# Patient Record
Sex: FEMALE | Race: WHITE | NOT HISPANIC OR LATINO | Employment: OTHER | URBAN - METROPOLITAN AREA
[De-identification: names, ages, dates, MRNs, and addresses within clinical notes are randomized per-mention and may not be internally consistent; named-entity substitution may affect disease eponyms.]

---

## 2017-08-11 ENCOUNTER — OFFICE VISIT (OUTPATIENT)
Dept: OPHTHALMOLOGY | Facility: CLINIC | Age: 67
End: 2017-08-11
Payer: MEDICARE

## 2017-08-11 DIAGNOSIS — H52.4 PRESBYOPIA: ICD-10-CM

## 2017-08-11 DIAGNOSIS — Z98.890 S/P LASIK SURGERY: ICD-10-CM

## 2017-08-11 DIAGNOSIS — H40.1131 PRIMARY OPEN ANGLE GLAUCOMA OF BOTH EYES, MILD STAGE: Primary | ICD-10-CM

## 2017-08-11 DIAGNOSIS — H25.13 AGE-RELATED NUCLEAR CATARACT OF BOTH EYES: ICD-10-CM

## 2017-08-11 PROCEDURE — 92014 COMPRE OPH EXAM EST PT 1/>: CPT | Performed by: STUDENT IN AN ORGANIZED HEALTH CARE EDUCATION/TRAINING PROGRAM

## 2017-08-11 PROCEDURE — 92015 DETERMINE REFRACTIVE STATE: CPT | Mod: GY | Performed by: STUDENT IN AN ORGANIZED HEALTH CARE EDUCATION/TRAINING PROGRAM

## 2017-08-11 RX ORDER — LATANOPROST 50 UG/ML
1 SOLUTION/ DROPS OPHTHALMIC AT BEDTIME
Qty: 3 BOTTLE | Refills: 3 | Status: SHIPPED | OUTPATIENT
Start: 2017-08-11 | End: 2018-07-31

## 2017-08-11 ASSESSMENT — TONOMETRY
OS_IOP_MMHG: 16
IOP_METHOD: APPLANATION
OD_IOP_MMHG: 17

## 2017-08-11 ASSESSMENT — EXTERNAL EXAM - RIGHT EYE: OD_EXAM: NORMAL

## 2017-08-11 ASSESSMENT — REFRACTION_MANIFEST
OD_AXIS: 080
OD_CYLINDER: +0.50
OD_SPHERE: -0.25
OS_CYLINDER: +1.75
OD_ADD: +3.00
OS_ADD: +3.00
OS_AXIS: 100
OS_SPHERE: -0.50

## 2017-08-11 ASSESSMENT — CONF VISUAL FIELD
OD_NORMAL: 1
OS_NORMAL: 1

## 2017-08-11 ASSESSMENT — VISUAL ACUITY
METHOD: SNELLEN - LINEAR
CORRECTION_TYPE: GLASSES
OS_CC: 20/50
OD_CC: 20/20

## 2017-08-11 ASSESSMENT — CUP TO DISC RATIO
OD_RATIO: 0.45
OS_RATIO: 0.65

## 2017-08-11 ASSESSMENT — EXTERNAL EXAM - LEFT EYE: OS_EXAM: NORMAL

## 2017-08-11 ASSESSMENT — SLIT LAMP EXAM - LIDS
COMMENTS: 1+ DERMATOCHALASIS
COMMENTS: 1+ DERMATOCHALASIS

## 2017-08-11 NOTE — PATIENT INSTRUCTIONS
Glasses prescription given - optional  Continue Latanoprost (teal top) at bedtime both eyes     Lea Wheeler MD  [phone]

## 2017-08-11 NOTE — MR AVS SNAPSHOT
"              After Visit Summary   8/11/2017    Mona Richey    MRN: [id number]           Patient Information     Date Of Birth          1950        Visit Information        Provider Department      8/11/2017 8:30 AM Lea Wheeler MD [facility]        Today's Diagnoses     Primary open angle glaucoma of both eyes, mild stage    -  1     S/P LASIK surgery OS (2001)        Age-related nuclear cataract of both eyes        Presbyopia          Care Instructions    Glasses prescription given - optional  Continue Latanoprost (teal top) at bedtime both eyes     Lea Wheeler MD  [phone]            Follow-ups after your visit        Follow-up notes from your care team     Return in about 10 months (around 6/11/2018) for IOP check, HVF, OCT optic nerve.      Who to contact     If you have questions or need follow up information about today's clinic visit or your schedule please contact [facility] directly at [phone].  Normal or non-critical lab and imaging results will be communicated to you by Red Seraphimhart, letter or phone within 4 business days after the clinic has received the results. If you do not hear from us within 7 days, please contact the clinic through [employer] or phone. If you have a critical or abnormal lab result, we will notify you by phone as soon as possible.  Submit refill requests through [employer] or call your pharmacy and they will forward the refill request to us. Please allow 3 business days for your refill to be completed.          Additional Information About Your Visit        Red Seraphimhart Information     [employer] lets you send messages to your doctor, view your test results, renew your prescriptions, schedule appointments and more. To sign up, go to [email or web address] . Click on \"Log in\" on the left side of the screen, which will take you to the Welcome page. Then click on \"Sign up Now\" on the right side of the page.     You will be asked to enter " the access code listed below, as well as some personal information. Please follow the directions to create your username and password.     Your access code is: [email or web address]  Expires: 2017  9:45 AM     Your access code will  in 90 days. If you need help or a new code, please call your [place] clinic or [phone].        Care EveryWhere ID     This is your Care EveryWhere ID. This could be used by other organizations to access your [place] medical records  [id number]         Blood Pressure from Last 3 Encounters:   No data found for BP    Weight from Last 3 Encounters:   No data found for Wt              We Performed the Following     EYE EXAM (SIMPLE-NONBILLABLE)     REFRACTIVE STATUS        Primary Care Provider Office Phone # Fax #    John Misael [phone]       [facility] [address] [place] [address]        Equal Access to Services     YANNICK DESAI : Hadii aad ku hadasho Soomaali, waaxda luqadaha, qaybta kaalmada adeegyada, waxay césarin hayaan elio carrera . So [facility] [phone].    ATENCIÓN: Si habla español, tiene a ackerman disposición servicios gratuitos de asistencia lingüística. Llame al [phone].    We comply with applicable federal civil rights laws and [place] laws. We do not discriminate on the basis of race, color, national origin, age, disability sex, sexual orientation or gender identity.            Thank you!     Thank you for choosing [facility] [place]  for your care. Our goal is always to provide you with excellent care. Hearing back from our patients is one way we can continue to improve our services. Please take a few minutes to complete the written survey that you may receive in the mail after your visit with us. Thank you!             Your Updated Medication List - Protect others around you: Learn how to safely use, store and throw away your medicines at www.disposemymeds.org.          This list is accurate as of:  8/11/17  9:45 AM.  Always use your most recent med list.                   Brand Name Dispense Instructions for use Diagnosis    ALLEGRA PO      Take  by mouth.        aspirin 81 MG tablet      Take 1 tablet by mouth daily.        CALCIUM + D PO      Take  by mouth.        carboxymethylcellulose 0.5 % Soln ophthalmic solution    REFRESH PLUS     Place 1 drop into both eyes 3 times daily as needed        cetirizine 10 MG tablet    zyrTEC     Take 10 mg by mouth daily        EVISTA PO      Take  by mouth.        FISH OIL PO      Take  by mouth.        latanoprost 0.005 % ophthalmic solution    XALATAN    1 Bottle    Place 1 drop into both eyes At Bedtime    Primary open angle glaucoma of both eyes, mild stage       LIPITOR PO      Take  by mouth.        MULTI-VITAMIN PO      Take  by mouth.        NASONEX NA      Spray  in nostril.        NORVASC 5 MG tablet   Generic drug:  amLODIPine      Take 5 mg by mouth daily.        vitamin E 400 UNIT capsule      Take 1 capsule by mouth daily.

## 2017-08-11 NOTE — PROGRESS NOTES
Current Eye Medications:  Latanoprost nightly ou , last drops 10pm     Subjective:  Comprehensive eye exam.   Patient reports is seeing well, vision seems unchanged and states eyes seem otherwise fine. Pt has hx of glaucoma and states has LASIK just in her left eye.     Objective:  See Ophthalmology Exam.       Assessment:  Mona Richey is a 67 year old female who presents with:   Encounter Diagnoses   Name Primary?     Primary open angle glaucoma of both eyes, mild stage Intraocular pressure 17/16 today.      S/P LASIK surgery OS (2001)      Age-related nuclear cataract of both eyes Not visually significant        Plan:  Glasses prescription given - optional  Continue Latanoprost (teal top) at bedtime both eyes     Lea Wheeler MD  [phone]

## 2018-07-31 DIAGNOSIS — H40.1131 PRIMARY OPEN ANGLE GLAUCOMA OF BOTH EYES, MILD STAGE: ICD-10-CM

## 2018-07-31 RX ORDER — LATANOPROST 50 UG/ML
1 SOLUTION/ DROPS OPHTHALMIC AT BEDTIME
Qty: 3 BOTTLE | Refills: 3 | Status: SHIPPED | OUTPATIENT
Start: 2018-07-31 | End: 2019-06-28

## 2018-08-14 ENCOUNTER — OFFICE VISIT (OUTPATIENT)
Dept: OPHTHALMOLOGY | Facility: CLINIC | Age: 68
End: 2018-08-14
Payer: MEDICARE

## 2018-08-14 DIAGNOSIS — H25.813 COMBINED FORM OF AGE-RELATED CATARACT, BOTH EYES: ICD-10-CM

## 2018-08-14 DIAGNOSIS — H40.1131 PRIMARY OPEN ANGLE GLAUCOMA OF BOTH EYES, MILD STAGE: Primary | ICD-10-CM

## 2018-08-14 DIAGNOSIS — H52.4 PRESBYOPIA: ICD-10-CM

## 2018-08-14 DIAGNOSIS — H43.813 POSTERIOR VITREOUS DETACHMENT OF BOTH EYES: ICD-10-CM

## 2018-08-14 DIAGNOSIS — Z98.890 S/P LASIK SURGERY: ICD-10-CM

## 2018-08-14 PROCEDURE — 92015 DETERMINE REFRACTIVE STATE: CPT | Mod: GY | Performed by: STUDENT IN AN ORGANIZED HEALTH CARE EDUCATION/TRAINING PROGRAM

## 2018-08-14 PROCEDURE — 92014 COMPRE OPH EXAM EST PT 1/>: CPT | Performed by: STUDENT IN AN ORGANIZED HEALTH CARE EDUCATION/TRAINING PROGRAM

## 2018-08-14 ASSESSMENT — VISUAL ACUITY
OS_CC: J2
METHOD: SNELLEN - LINEAR
OD_SC: 20/25-1
OS_SC: 20/30+3
OD_CC: J2

## 2018-08-14 ASSESSMENT — TONOMETRY
OD_IOP_MMHG: 20
OS_IOP_MMHG: 18
IOP_METHOD: APPLANATION

## 2018-08-14 ASSESSMENT — REFRACTION_WEARINGRX
OD_CYLINDER: SPHERE
SPECS_TYPE: READERS
OS_SPHERE: +2.50
OD_SPHERE: +2.50
OS_CYLINDER: SPHERE

## 2018-08-14 ASSESSMENT — REFRACTION_MANIFEST
OS_SPHERE: -0.25
OS_AXIS: 107
OS_CYLINDER: +1.00
OD_CYLINDER: SPHERE
OD_SPHERE: +0.50
OS_ADD: +2.50
OD_ADD: +2.50

## 2018-08-14 ASSESSMENT — EXTERNAL EXAM - LEFT EYE: OS_EXAM: NORMAL

## 2018-08-14 ASSESSMENT — SLIT LAMP EXAM - LIDS
COMMENTS: 1+ DERMATOCHALASIS
COMMENTS: 1+ DERMATOCHALASIS

## 2018-08-14 ASSESSMENT — CUP TO DISC RATIO
OS_RATIO: 0.65
OD_RATIO: 0.45

## 2018-08-14 ASSESSMENT — CONF VISUAL FIELD
OS_NORMAL: 1
OD_NORMAL: 1

## 2018-08-14 ASSESSMENT — EXTERNAL EXAM - RIGHT EYE: OD_EXAM: NORMAL

## 2018-08-14 NOTE — PATIENT INSTRUCTIONS
Continue Latanoprost (teal top) at bedtime both eyes   Ok to continue over the counter readers    Lea Wheeler MD  [phone]

## 2018-08-14 NOTE — PROGRESS NOTES
Current Eye Medications: latanoprost nightly both eyes, last drops at 10pm     Subjective:  Comprehensive eye exam.   Patient reports is seeing well, vision seems unchanged and states eyes seem otherwise fine.  Pt uses otc readers only.     Objective:  See Ophthalmology Exam.      Assessment:  Mnoa Richey is a 68 year old female who presents with:   Encounter Diagnoses   Name Primary?     Primary open angle glaucoma of both eyes, mild stage Did not schedule glaucoma tests in June this year as recommended (was on a  trip). Will obtain those tests when she returns to [place] next summer.       S/P LASIK surgery OS (2001)      Combined form of age-related cataract, both eyes      Posterior vitreous detachment of both eyes        Plan:  Continue Latanoprost (teal top) at bedtime both eyes   Ok to continue over the counter readers    eLa Wheeler MD  [phone]

## 2018-08-14 NOTE — MR AVS SNAPSHOT
"              After Visit Summary   8/14/2018    Mona Richey    MRN: [id number]           Patient Information     Date Of Birth          1950        Visit Information        Provider Department      8/14/2018 8:30 AM Lea Wheeler MD [facility]        Today's Diagnoses     Presbyopia    -  1    Posterior vitreous detachment of both eyes        Combined form of age-related cataract, both eyes        Primary open angle glaucoma of both eyes, mild stage          Care Instructions    Continue Latanoprost (teal top) at bedtime both eyes   Ok to continue over the counter readers    Lea Wheeler MD  [phone]              Follow-ups after your visit        Follow-up notes from your care team     Return in about 10 months (around 6/14/2019) for IOP check, HVF, OCT optic nerve.      Who to contact     If you have questions or need follow up information about today's clinic visit or your schedule please contact [facility] directly at [phone].  Normal or non-critical lab and imaging results will be communicated to you by Archev[employer]art, letter or phone within 4 business days after the clinic has received the results. If you do not hear from us within 7 days, please contact the clinic through [employer] or phone. If you have a critical or abnormal lab result, we will notify you by phone as soon as possible.  Submit refill requests through [employer] or call your pharmacy and they will forward the refill request to us. Please allow 3 business days for your refill to be completed.          Additional Information About Your Visit        Ar[employer]art Information     [employer] lets you send messages to your doctor, view your test results, renew your prescriptions, schedule appointments and more. To sign up, go to [email or web address] . Click on \"Log in\" on the left side of the screen, which will take you to the Welcome page. Then click on \"Sign up Now\" on the right side of the page.     You " will be asked to enter the access code listed below, as well as some personal information. Please follow the directions to create your username and password.     Your access code is: [email or web address]  Expires: 2018  9:24 AM     Your access code will  in 90 days. If you need help or a new code, please call your [place] clinic or [phone].        Care EveryWhere ID     This is your Care EveryWhere ID. This could be used by other organizations to access your [place] medical records  [id number]         Blood Pressure from Last 3 Encounters:   No data found for BP    Weight from Last 3 Encounters:   No data found for Wt              Today, you had the following     No orders found for display       Primary Care Provider Office Phone # Fax #    John Misael [phone]       [facility] [address] [place] [address]        Equal Access to Services     [facility]: Hadii aad ku hadasho Soomaali, waaxda luqadaha, qaybta kaalmada adeegyada, waxay idiin hayaan adeelsa carrera . So [facility] [phone].    ATENCIÓN: Si habla español, tiene a ackerman disposición servicios gratuitos de asistencia lingüística. Llame al [phone].    We comply with applicable federal civil rights laws and [place] laws. We do not discriminate on the basis of race, color, national origin, age, disability, sex, sexual orientation, or gender identity.            Thank you!     Thank you for choosing [facility] [place]  for your care. Our goal is always to provide you with excellent care. Hearing back from our patients is one way we can continue to improve our services. Please take a few minutes to complete the written survey that you may receive in the mail after your visit with us. Thank you!             Your Updated Medication List - Protect others around you: Learn how to safely use, store and throw away your medicines at www.disposemymeds.org.          This list is accurate as of  8/14/18  9:24 AM.  Always use your most recent med list.                   Brand Name Dispense Instructions for use Diagnosis    ALLEGRA PO      Take  by mouth.        aspirin 81 MG tablet      Take 1 tablet by mouth daily.        CALCIUM + D PO      Take  by mouth.        carboxymethylcellulose 0.5 % Soln ophthalmic solution    REFRESH PLUS     Place 1 drop into both eyes 3 times daily as needed        cetirizine 10 MG tablet    zyrTEC     Take 10 mg by mouth daily        EVISTA PO      Take  by mouth.        FISH OIL PO      Take  by mouth.        latanoprost 0.005 % ophthalmic solution    XALATAN    3 Bottle    Place 1 drop into both eyes At Bedtime    Primary open angle glaucoma of both eyes, mild stage       LIPITOR PO      Take  by mouth.        MULTI-VITAMIN PO      Take  by mouth.        NASONEX NA      Spray  in nostril.        NORVASC 5 MG tablet   Generic drug:  amLODIPine      Take 5 mg by mouth daily.        vitamin E 400 UNIT capsule      Take 1 capsule by mouth daily.

## 2018-08-14 NOTE — LETTER
8/14/2018         RE: Mona Richey  [address] [address]        Dear Colleague,    Thank you for referring your patient, Mona Richey, to the [facility].    Her eye exam is stable.  Please see a copy of my visit note below.     Current Eye Medications: latanoprost nightly both eyes, last drops at 10pm     Subjective:  Comprehensive eye exam.   Patient reports is seeing well, vision seems unchanged and states eyes seem otherwise fine.  Pt uses otc readers only.     Objective:  See Ophthalmology Exam.      Assessment:  Mona Richey is a 68 year old female who presents with:   Encounter Diagnoses   Name Primary?     Primary open angle glaucoma of both eyes, mild stage Yes      S/P LASIK surgery OS (2001)      Combined form of age-related cataract, both eyes      Posterior vitreous detachment of both eyes        Plan:  Continue Latanoprost (teal top) at bedtime both eyes   Ok to continue over the counter readers    Lea Wheeler MD  [phone]               Again, thank you for allowing me to participate in the care of your patient.        Sincerely,        Lea Wheeler MD

## 2019-04-02 ENCOUNTER — DOCUMENTATION ONLY (OUTPATIENT)
Dept: OPHTHALMOLOGY | Facility: CLINIC | Age: 69
End: 2019-04-02

## 2019-06-26 ENCOUNTER — OFFICE VISIT (OUTPATIENT)
Dept: OPHTHALMOLOGY | Facility: CLINIC | Age: 69
End: 2019-06-26
Payer: MEDICARE

## 2019-06-26 DIAGNOSIS — H40.1131 PRIMARY OPEN ANGLE GLAUCOMA OF BOTH EYES, MILD STAGE: Primary | ICD-10-CM

## 2019-06-26 DIAGNOSIS — Z98.890 S/P LASIK SURGERY: ICD-10-CM

## 2019-06-26 PROCEDURE — 92012 INTRM OPH EXAM EST PATIENT: CPT | Performed by: STUDENT IN AN ORGANIZED HEALTH CARE EDUCATION/TRAINING PROGRAM

## 2019-06-26 PROCEDURE — 92133 CPTRZD OPH DX IMG PST SGM ON: CPT | Performed by: STUDENT IN AN ORGANIZED HEALTH CARE EDUCATION/TRAINING PROGRAM

## 2019-06-26 PROCEDURE — 92083 EXTENDED VISUAL FIELD XM: CPT | Performed by: STUDENT IN AN ORGANIZED HEALTH CARE EDUCATION/TRAINING PROGRAM

## 2019-06-26 RX ORDER — DORZOLAMIDE HYDROCHLORIDE AND TIMOLOL MALEATE 20; 5 MG/ML; MG/ML
1 SOLUTION/ DROPS OPHTHALMIC 2 TIMES DAILY
Qty: 1 BOTTLE | Refills: 11 | Status: SHIPPED | OUTPATIENT
Start: 2019-06-26 | End: 2019-06-27

## 2019-06-26 ASSESSMENT — VISUAL ACUITY
OD_SC: 20/25+3
METHOD: SNELLEN - LINEAR
OS_PH_SC: 20/25+3
OS_SC: 20/40-2

## 2019-06-26 ASSESSMENT — CUP TO DISC RATIO
OD_RATIO: 0.45
OS_RATIO: 0.65

## 2019-06-26 ASSESSMENT — TONOMETRY
OS_IOP_MMHG: 17
IOP_METHOD: APPLANATION
OD_IOP_MMHG: 18

## 2019-06-26 ASSESSMENT — EXTERNAL EXAM - LEFT EYE: OS_EXAM: NORMAL

## 2019-06-26 ASSESSMENT — SLIT LAMP EXAM - LIDS
COMMENTS: 1+ DERMATOCHALASIS
COMMENTS: 1+ DERMATOCHALASIS

## 2019-06-26 ASSESSMENT — EXTERNAL EXAM - RIGHT EYE: OD_EXAM: NORMAL

## 2019-06-26 NOTE — PATIENT INSTRUCTIONS
Continue Latanoprost (teal top) at bedtime both eyes     Start Cosopt (dorzolamide-timolol -- dark blue top) twice a day in both eyes     Return for eye pressure check in 3 weeks    Lea Wheeler MD  [phone]

## 2019-06-26 NOTE — PROGRESS NOTES
Current Eye Medications:  Latanoprost nightly drops a 10 pm     Subjective:  6 mo iop with oct and hvf  Pt reports she is seeing pretty good, thinks may be getting eye strain because has been reading a lot.     Objective:  See Ophthalmology Exam.      Assessment:  Mona Richey is a 69 year old female who presents with:   Encounter Diagnoses   Name Primary?     Primary open angle glaucoma of both eyes, mild stage Intraocular pressure 18/17 today.     Ruby visual field (HVF): new inferior arcuate defect and superior nasal step  - mild right eye; stable inferior loss left eye. Visual field was within normal limits last time.    OCT: mild increased thinning both eyes.    Add Cosopt twice a day both eyes         S/P LASIK surgery OS (2001)      Plan:  Continue Latanoprost (teal top) at bedtime both eyes   Start Cosopt (dorzolamide-timolol -- dark blue top) twice a day in both eyes   Return for eye pressure check in 3 weeks    Lea Wheeler MD  [phone]

## 2019-06-26 NOTE — LETTER
6/26/2019         RE: Mona Richey  [address] [facility] [address]        Dear Colleague,    Thank you for referring your patient, Mona Richey, to the [facility]. Please see a copy of my visit note below.     Current Eye Medications:  Latanoprost nightly drops a 10 pm     Subjective:  6 mo iop with oct and hvf  Pt reports she is seeing pretty good, thinks may be getting eye strain because has been reading a lot.     Objective:  See Ophthalmology Exam.      Assessment:  Mona Richey is a 69 year old female who presents with:   Encounter Diagnoses   Name Primary?     Primary open angle glaucoma of both eyes, mild stage Intraocular pressure 18/17 today.     Ruby visual field (HVF): new inferior arcuate defect and superior nasal step  - mild right eye; stable inferior loss left eye. Visual field was within normal limits last time.    OCT: mild increased thinning both eyes.    Add Cosopt twice a day both eyes         S/P LASIK surgery OS (2001)      Plan:  Continue Latanoprost (teal top) at bedtime both eyes   Start Cosopt (dorzolamide-timolol -- dark blue top) twice a day in both eyes   Return for eye pressure check in 3 weeks    Lea Wheeler MD  [phone]               Again, thank you for allowing me to participate in the care of your patient.        Sincerely,        Lea Wheeler MD

## 2019-06-27 DIAGNOSIS — H40.1131 PRIMARY OPEN ANGLE GLAUCOMA OF BOTH EYES, MILD STAGE: ICD-10-CM

## 2019-06-27 RX ORDER — DORZOLAMIDE HYDROCHLORIDE AND TIMOLOL MALEATE 20; 5 MG/ML; MG/ML
1 SOLUTION/ DROPS OPHTHALMIC 2 TIMES DAILY
Qty: 20 ML | Refills: 3 | Status: SHIPPED | OUTPATIENT
Start: 2019-06-27 | End: 2020-07-08

## 2019-06-27 NOTE — TELEPHONE ENCOUNTER
Fax received from pharmacy requesting 90 day supply of dorzolamide-timolol (COSOPT) 2-0.5 % ophthalmic solution. Medication ordered and updated and sent to ophthalmology team for approval.

## 2019-06-28 DIAGNOSIS — H40.1131 PRIMARY OPEN ANGLE GLAUCOMA OF BOTH EYES, MILD STAGE: ICD-10-CM

## 2019-06-28 RX ORDER — LATANOPROST 50 UG/ML
1 SOLUTION/ DROPS OPHTHALMIC AT BEDTIME
Qty: 3 BOTTLE | Refills: 3 | Status: SHIPPED | OUTPATIENT
Start: 2019-06-28 | End: 2020-05-19

## 2019-07-23 ENCOUNTER — OFFICE VISIT (OUTPATIENT)
Dept: OPHTHALMOLOGY | Facility: CLINIC | Age: 69
End: 2019-07-23
Payer: MEDICARE

## 2019-07-23 DIAGNOSIS — H40.1131 PRIMARY OPEN ANGLE GLAUCOMA OF BOTH EYES, MILD STAGE: Primary | ICD-10-CM

## 2019-07-23 DIAGNOSIS — Z98.890 S/P LASIK SURGERY: ICD-10-CM

## 2019-07-23 PROCEDURE — 92012 INTRM OPH EXAM EST PATIENT: CPT | Performed by: STUDENT IN AN ORGANIZED HEALTH CARE EDUCATION/TRAINING PROGRAM

## 2019-07-23 ASSESSMENT — SLIT LAMP EXAM - LIDS
COMMENTS: 1+ DERMATOCHALASIS
COMMENTS: 1+ DERMATOCHALASIS

## 2019-07-23 ASSESSMENT — VISUAL ACUITY
OD_PH_SC+: -1
OS_PH_SC: 20/30
OS_SC: 20/40
OS_SC+: +2
OD_SC+: -2
OD_PH_SC: 20/30
METHOD: SNELLEN - LINEAR
OD_SC: 20/30
OS_PH_SC+: -2

## 2019-07-23 ASSESSMENT — EXTERNAL EXAM - RIGHT EYE: OD_EXAM: NORMAL

## 2019-07-23 ASSESSMENT — TONOMETRY
IOP_METHOD: APPLANATION
OD_IOP_MMHG: 16
OS_IOP_MMHG: 16
IOP_METHOD: APPLANATION
OS_IOP_MMHG: 15

## 2019-07-23 ASSESSMENT — CUP TO DISC RATIO
OS_RATIO: 0.65
OD_RATIO: 0.45

## 2019-07-23 ASSESSMENT — EXTERNAL EXAM - LEFT EYE: OS_EXAM: NORMAL

## 2019-07-23 NOTE — PROGRESS NOTES
Current Eye Medications:   Latanoprost at bedtime both eyes and Cosopt twice a day in both eyes      Subjective:  Here for IOP check today.  Cosopt stings a little but not too bad. Has been taking the new drop for about three weeks now.     Objective:  See Ophthalmology Exam.      Assessment:  Mona Richey is a 69 year old female who presents with:     Primary open angle glaucoma of both eyes, mild stage Intraocular pressure a bit better with addition of Cosopt. Continue.       S/P LASIK surgery OS (2001)        Plan:  Continue Latanoprost (teal top) at bedtime both eyes   Continue Cosopt (dorzolamide-timolol -- dark blue top) twice a day both eyes    Lea Wheeler MD  [phone]

## 2019-07-23 NOTE — LETTER
7/23/2019         RE: Mona Richey  [address] [facility] [address]        Dear Colleague,    Thank you for referring your patient, Mona Richey, to the [facility]. Please see a copy of my visit note below.     Current Eye Medications:   Latanoprost at bedtime both eyes and Cosopt twice a day in both eyes      Subjective:  Here for IOP check today.  Cosopt stings a little but not too bad. Has been taking the new drop for about three weeks now.     Objective:  See Ophthalmology Exam.      Assessment:  Mona Richey is a 69 year old female who presents with:     Primary open angle glaucoma of both eyes, mild stage Intraocular pressure a bit better with addition of Cosopt. Continue.       S/P LASIK surgery OS (2001)        Plan:  Continue Latanoprost (teal top) at bedtime both eyes   Continue Cosopt (dorzolamide-timolol -- dark blue top) twice a day both eyes    Lea Wheeler MD  [phone]             Again, thank you for allowing me to participate in the care of your patient.        Sincerely,        Lea Wheeler MD

## 2019-07-23 NOTE — PATIENT INSTRUCTIONS
Continue Latanoprost (teal top) at bedtime both eyes     Continue Cosopt (dorzolamide-timolol -- dark blue top) twice a day both eyes    Lea Wheeler MD  [phone]

## 2020-05-19 DIAGNOSIS — H40.1131 PRIMARY OPEN ANGLE GLAUCOMA OF BOTH EYES, MILD STAGE: ICD-10-CM

## 2020-05-19 RX ORDER — LATANOPROST 50 UG/ML
1 SOLUTION/ DROPS OPHTHALMIC AT BEDTIME
Qty: 3 BOTTLE | Refills: 1 | Status: SHIPPED | OUTPATIENT
Start: 2020-05-19 | End: 2020-11-25

## 2020-05-19 NOTE — TELEPHONE ENCOUNTER
Last appointment:  July of 2019.  No future appointments scheduled.  Latanoprost refilled per COVID-19 protocol (patient is in [place]).

## 2020-07-08 DIAGNOSIS — H40.1131 PRIMARY OPEN ANGLE GLAUCOMA OF BOTH EYES, MILD STAGE: ICD-10-CM

## 2020-07-08 RX ORDER — DORZOLAMIDE HYDROCHLORIDE AND TIMOLOL MALEATE 20; 5 MG/ML; MG/ML
1 SOLUTION/ DROPS OPHTHALMIC 2 TIMES DAILY
Qty: 20 ML | Refills: 3 | Status: SHIPPED | OUTPATIENT
Start: 2020-07-08 | End: 2021-05-04

## 2020-07-08 NOTE — TELEPHONE ENCOUNTER
Fax received from pharmacy requesting refill of dorzolamide-timolol (COSOPT) 2-0.5 % ophthalmic solution .

## 2020-07-08 NOTE — TELEPHONE ENCOUNTER
Recommend refilling drops, Per Dr. Wheeler's last visit note on 7/23/20:Continue Latanoprost (teal top) at bedtime both eyes   Continue Cosopt (dorzolamide-timolol -- dark blue top) twice a day both eyes

## 2020-07-22 ENCOUNTER — OFFICE VISIT (OUTPATIENT)
Dept: OPHTHALMOLOGY | Facility: CLINIC | Age: 70
End: 2020-07-22
Payer: MEDICARE

## 2020-07-22 DIAGNOSIS — H25.813 COMBINED FORM OF AGE-RELATED CATARACT, BOTH EYES: ICD-10-CM

## 2020-07-22 DIAGNOSIS — Z98.890 S/P LASIK SURGERY: ICD-10-CM

## 2020-07-22 DIAGNOSIS — H40.1131 PRIMARY OPEN ANGLE GLAUCOMA OF BOTH EYES, MILD STAGE: ICD-10-CM

## 2020-07-22 DIAGNOSIS — H43.813 POSTERIOR VITREOUS DETACHMENT OF BOTH EYES: ICD-10-CM

## 2020-07-22 DIAGNOSIS — H52.203 ASTIGMATISM OF BOTH EYES, UNSPECIFIED TYPE: ICD-10-CM

## 2020-07-22 DIAGNOSIS — Z01.00 EXAMINATION OF EYES AND VISION: Primary | ICD-10-CM

## 2020-07-22 DIAGNOSIS — H52.13 MYOPIA OF BOTH EYES: ICD-10-CM

## 2020-07-22 DIAGNOSIS — H52.4 PRESBYOPIA: ICD-10-CM

## 2020-07-22 PROCEDURE — 92015 DETERMINE REFRACTIVE STATE: CPT | Mod: GY | Performed by: STUDENT IN AN ORGANIZED HEALTH CARE EDUCATION/TRAINING PROGRAM

## 2020-07-22 PROCEDURE — 92014 COMPRE OPH EXAM EST PT 1/>: CPT | Performed by: STUDENT IN AN ORGANIZED HEALTH CARE EDUCATION/TRAINING PROGRAM

## 2020-07-22 RX ORDER — HYDROCHLOROTHIAZIDE 25 MG/1
TABLET ORAL
COMMUNITY
Start: 2020-05-18

## 2020-07-22 RX ORDER — CELECOXIB 200 MG/1
CAPSULE ORAL
COMMUNITY
Start: 2020-01-20 | End: 2021-07-19

## 2020-07-22 RX ORDER — FAMOTIDINE 20 MG/1
TABLET, FILM COATED ORAL
COMMUNITY
Start: 2019-10-11

## 2020-07-22 ASSESSMENT — EXTERNAL EXAM - LEFT EYE: OS_EXAM: NORMAL

## 2020-07-22 ASSESSMENT — TONOMETRY
OD_IOP_MMHG: 16
OS_IOP_MMHG: 14
IOP_METHOD: APPLANATION

## 2020-07-22 ASSESSMENT — VISUAL ACUITY
METHOD: SNELLEN - LINEAR
OS_PH_SC+: +2
OD_SC: 20/40
OS_PH_SC: 20/40
OS_SC: 20/40

## 2020-07-22 ASSESSMENT — CUP TO DISC RATIO
OD_RATIO: 0.45
OS_RATIO: 0.65

## 2020-07-22 ASSESSMENT — REFRACTION_MANIFEST
OS_AXIS: 099
OD_ADD: +2.75
OS_ADD: +2.75
OD_AXIS: 060
OS_SPHERE: -0.75
OD_CYLINDER: +0.75
OD_SPHERE: -0.25
OS_CYLINDER: +0.75

## 2020-07-22 ASSESSMENT — SLIT LAMP EXAM - LIDS
COMMENTS: 1+ DERMATOCHALASIS
COMMENTS: 1+ DERMATOCHALASIS

## 2020-07-22 ASSESSMENT — CONF VISUAL FIELD
OS_NORMAL: 1
OD_NORMAL: 1

## 2020-07-22 ASSESSMENT — EXTERNAL EXAM - RIGHT EYE: OD_EXAM: NORMAL

## 2020-07-22 NOTE — PROGRESS NOTES
Current Eye Medications:  cosopt both eyes twice a day, Latanoprost both eyes every evening.  Last drops:  8am.       Subjective:  Comprehensive Eye Exam.  She is noticing decreasing distance vision in each eye.  She does a lot of reading, and uses over-the-counter readers, which are working well.   She had knee replacement surgery in January.       Objective:  See Ophthalmology Exam.       Assessment:  Mona Richey is a 70 year old female who presents with:   Encounter Diagnoses   Name Primary?     Examination of eyes and vision      Myopia of both eyes      Astigmatism of both eyes, unspecified type      Presbyopia        Primary open angle glaucoma of both eyes, mild stage Intraocular pressure 16/14 today with stable appearing discs.      S/P LASIK surgery OS (2001)      Combined form of age-related cataract, both eyes Anticipate cataract surgery next summer.     Posterior vitreous detachment of both eyes        Plan:  Continue Latanoprost (green top) at bedtime both eyes     Continue Cosopt (dorzolamide-timolol -- dark blue top) twice a day both eyes    Return for glaucoma tests in 1 month    Lea Wheeler MD  [phone]

## 2020-07-22 NOTE — LETTER
7/22/2020         RE: Mona Richey  [address] [address]        Dear Colleague,    Thank you for referring your patient, Mona Richey, to the [facility]. Please see a copy of my visit note below.     Current Eye Medications:  cosopt both eyes twice a day, Latanoprost both eyes every evening.  Last drops:  8am.       Subjective:  Comprehensive Eye Exam.  She is noticing decreasing distance vision in each eye.  She does a lot of reading, and uses over-the-counter readers, which are working well.   She had knee replacement surgery in January.       Objective:  See Ophthalmology Exam.       Assessment:  Mona Richey is a 70 year old female who presents with:   Encounter Diagnoses   Name Primary?     Examination of eyes and vision      Myopia of both eyes      Astigmatism of both eyes, unspecified type      Presbyopia        Primary open angle glaucoma of both eyes, mild stage Intraocular pressure 16/14 today with stable appearing discs.      S/P LASIK surgery OS (2001)      Combined form of age-related cataract, both eyes Anticipate cataract surgery next summer.     Posterior vitreous detachment of both eyes        Plan:  Continue Latanoprost (green top) at bedtime both eyes     Continue Cosopt (dorzolamide-timolol -- dark blue top) twice a day both eyes    Return for glaucoma tests in 1 month    Lea Wheeler MD  [phone]      Again, thank you for allowing me to participate in the care of your patient.        Sincerely,        Lea Wheeler MD

## 2020-08-05 ENCOUNTER — TRANSFERRED RECORDS (OUTPATIENT)
Dept: MULTI SPECIALTY CLINIC | Facility: CLINIC | Age: 70
End: 2020-08-05

## 2020-09-23 ENCOUNTER — OFFICE VISIT (OUTPATIENT)
Dept: OPHTHALMOLOGY | Facility: CLINIC | Age: 70
End: 2020-09-23
Payer: MEDICARE

## 2020-09-23 DIAGNOSIS — Z98.890 S/P LASIK SURGERY: ICD-10-CM

## 2020-09-23 DIAGNOSIS — H40.1131 PRIMARY OPEN ANGLE GLAUCOMA OF BOTH EYES, MILD STAGE: Primary | ICD-10-CM

## 2020-09-23 PROCEDURE — 92083 EXTENDED VISUAL FIELD XM: CPT | Performed by: STUDENT IN AN ORGANIZED HEALTH CARE EDUCATION/TRAINING PROGRAM

## 2020-09-23 PROCEDURE — 92012 INTRM OPH EXAM EST PATIENT: CPT | Performed by: STUDENT IN AN ORGANIZED HEALTH CARE EDUCATION/TRAINING PROGRAM

## 2020-09-23 PROCEDURE — 92133 CPTRZD OPH DX IMG PST SGM ON: CPT | Performed by: STUDENT IN AN ORGANIZED HEALTH CARE EDUCATION/TRAINING PROGRAM

## 2020-09-23 RX ORDER — BRIMONIDINE TARTRATE 2 MG/ML
1 SOLUTION/ DROPS OPHTHALMIC 2 TIMES DAILY
Qty: 1 BOTTLE | Refills: 11 | Status: SHIPPED | OUTPATIENT
Start: 2020-09-23 | End: 2021-09-09

## 2020-09-23 ASSESSMENT — EXTERNAL EXAM - LEFT EYE: OS_EXAM: NORMAL

## 2020-09-23 ASSESSMENT — VISUAL ACUITY
METHOD: SNELLEN - LINEAR
OS_SC+: -2
OD_PH_SC: 20/30
OS_SC: 20/50
OS_PH_SC: 20/40
OD_SC: 20/40

## 2020-09-23 ASSESSMENT — TONOMETRY
OS_IOP_MMHG: 14
IOP_METHOD: APPLANATION
OD_IOP_MMHG: 16

## 2020-09-23 ASSESSMENT — SLIT LAMP EXAM - LIDS
COMMENTS: 1+ DERMATOCHALASIS
COMMENTS: 1+ DERMATOCHALASIS

## 2020-09-23 ASSESSMENT — CUP TO DISC RATIO
OD_RATIO: 0.45
OS_RATIO: 0.65

## 2020-09-23 ASSESSMENT — EXTERNAL EXAM - RIGHT EYE: OD_EXAM: NORMAL

## 2020-09-23 NOTE — PATIENT INSTRUCTIONS
Add Brimonidine (purple top) twice a day in both eyes     Continue Latanoprost (green top) at bedtime both eyes     Continue Cosopt (dorzolamide-timolol -- dark blue top) twice a day both eyes    Return for eye pressure check in 2 weeks    Lea Wheeler MD  [phone]

## 2020-09-23 NOTE — PROGRESS NOTES
Current Eye Medications:  cosopt both eyes twice a day, Latanoprost both eyes every evening.  Last drops:  8am     Subjective:  Follow up Open Angle Glaucoma.  Patient is here for a pressure check, OCT, and visual field.  No vision changes or concerns.      Objective:  See Ophthalmology Exam.       Assessment:  Mona Richey is a 70 year old female who presents with:   Encounter Diagnoses   Name Primary?     Primary open angle glaucoma of both eyes, mild stage Mild continued increased thinning on OCT in the right eye. Intraocular pressure 16/14 today. Add brimonidine twice a day in both eyes.     OCT optic nerve: avg retinal nerve fiber layer 84/79 (prev 89/79 in 2019 and 93/88 in 2016).     Ruby visual field (HVF) 24-2: mild inferior nasal step right eye; stable inferior arcuate defect left eye.    Leaving for [place] this Friday. Will start brimonidine and check intraocular pressure in December when she returns briefly for the holidays. Likely cataract surgery next summer (or consider Selected laser trabeculoplasty (SLT) if needed).       S/P LASIK surgery OS (2001)        Plan:  Patient Instructions   Add Brimonidine (purple top) twice a day in both eyes     Continue Latanoprost (green top) at bedtime both eyes     Continue Cosopt (dorzolamide-timolol -- dark blue top) twice a day both eyes    Return for eye pressure check in 2 weeks    Lea Wheeler MD  [phone]

## 2020-09-23 NOTE — LETTER
9/23/2020         RE: Mona Richey  [address] [facility] [address]        Dear Colleague,    Thank you for referring your patient, Mona Richey, to the [facility]. Please see a copy of my visit note below.     Current Eye Medications:  cosopt both eyes twice a day, Latanoprost both eyes every evening.  Last drops:  8am     Subjective:  Follow up Open Angle Glaucoma.  Patient is here for a pressure check, OCT, and visual field.  No vision changes or concerns.      Objective:  See Ophthalmology Exam.       Assessment:  Mona Richey is a 70 year old female who presents with:   Encounter Diagnoses   Name Primary?     Primary open angle glaucoma of both eyes, mild stage Mild continued increased thinning on OCT in the right eye. Intraocular pressure 16/14 today. Add brimonidine twice a day in both eyes.     OCT optic nerve: avg retinal nerve fiber layer 84/79 (prev 89/79 in 2019 and 93/88 in 2016).     Ruby visual field (HVF) 24-2: mild inferior nasal step right eye; stable inferior arcuate defect left eye.    Leaving for [place] this Friday. Will start brimonidine and check intraocular pressure in December when she returns briefly for the holidays. Likely cataract surgery next summer (or consider Selected laser trabeculoplasty (SLT) if needed).       S/P LASIK surgery OS (2001)        Plan:  Patient Instructions   Add Brimonidine (purple top) twice a day in both eyes     Continue Latanoprost (green top) at bedtime both eyes     Continue Cosopt (dorzolamide-timolol -- dark blue top) twice a day both eyes    Return for eye pressure check in 2 weeks    Lea Wheeler MD  [phone]            Again, thank you for allowing me to participate in the care of your patient.        Sincerely,        Lea Wheeler MD

## 2020-09-24 RX ORDER — BRIMONIDINE TARTRATE 2 MG/ML
1 SOLUTION/ DROPS OPHTHALMIC 2 TIMES DAILY
Qty: 3 BOTTLE | Refills: 4 | Status: SHIPPED | OUTPATIENT
Start: 2020-09-24 | End: 2021-07-01

## 2020-09-24 NOTE — PROGRESS NOTES
Patient would like 90 day supply of Brimonidine. Will send to Dr. Wheeler to sign and send new Rx.

## 2020-11-25 DIAGNOSIS — H40.1131 PRIMARY OPEN ANGLE GLAUCOMA OF BOTH EYES, MILD STAGE: ICD-10-CM

## 2020-11-25 RX ORDER — LATANOPROST 50 UG/ML
1 SOLUTION/ DROPS OPHTHALMIC AT BEDTIME
Qty: 3 BOTTLE | Refills: 0 | Status: SHIPPED | OUTPATIENT
Start: 2020-11-25 | End: 2021-04-05

## 2020-11-25 NOTE — TELEPHONE ENCOUNTER
Received refill request for Latanoprost. Patient has an upcoming appointment on 12-9-20 with Dr. Wheeler. Will send to Dr. Wheeler to approve.

## 2021-01-01 NOTE — PATIENT INSTRUCTIONS
Continue Latanoprost (green top) at bedtime both eyes     Continue Cosopt (dorzolamide-timolol -- dark blue top) twice a day both eyes    Return for glaucoma tests in 1 month    Lea Wheeler MD  [phone]     initiation of breastfeeding/breast milk feeding

## 2021-04-05 ENCOUNTER — TELEPHONE (OUTPATIENT)
Dept: OPHTHALMOLOGY | Facility: CLINIC | Age: 71
End: 2021-04-05

## 2021-04-05 DIAGNOSIS — H40.1131 PRIMARY OPEN ANGLE GLAUCOMA OF BOTH EYES, MILD STAGE: ICD-10-CM

## 2021-04-05 RX ORDER — LATANOPROST 50 UG/ML
1 SOLUTION/ DROPS OPHTHALMIC AT BEDTIME
Qty: 7.5 ML | Refills: 4 | Status: SHIPPED | OUTPATIENT
Start: 2021-04-05 | End: 2021-04-06

## 2021-04-05 NOTE — TELEPHONE ENCOUNTER
Patient called stating that she would like to speak to someone on Dr. Wheeler's care team. She is stating that the prescription for the latanoprost (XALATAN) 0.005 % ophthalmic solution was not approved by the pharmacy and that she would like a call back to see if there is any reason why this would be or if there is a different option. She would like a call back to discuss at [phone] as soon as possible.    Thank You,    Gregorio Stokes  Patient Rep

## 2021-04-05 NOTE — TELEPHONE ENCOUNTER
Patient is out of medication as she only filled one bottle prior to leaving for [place]. Will send another Rx for 90 days instead of 30 as she is out of drops. Has an appointment in May for Dr. Wheeler again, not abusing the drops she assures me, just has run out.  She will call back if the drops aren't actually covered when she gets to the pharmacy to .

## 2021-04-06 DIAGNOSIS — H40.1131 PRIMARY OPEN ANGLE GLAUCOMA OF BOTH EYES, MILD STAGE: ICD-10-CM

## 2021-04-07 RX ORDER — LATANOPROST 50 UG/ML
1 SOLUTION/ DROPS OPHTHALMIC AT BEDTIME
Qty: 7.5 ML | Refills: 4 | Status: SHIPPED | OUTPATIENT
Start: 2021-04-07 | End: 2021-09-09

## 2021-05-04 DIAGNOSIS — H40.1131 PRIMARY OPEN ANGLE GLAUCOMA OF BOTH EYES, MILD STAGE: ICD-10-CM

## 2021-05-04 RX ORDER — DORZOLAMIDE HYDROCHLORIDE AND TIMOLOL MALEATE 20; 5 MG/ML; MG/ML
1 SOLUTION/ DROPS OPHTHALMIC 2 TIMES DAILY
Qty: 20 ML | Refills: 0 | Status: SHIPPED | OUTPATIENT
Start: 2021-05-04 | End: 2021-08-05

## 2021-05-04 NOTE — TELEPHONE ENCOUNTER
Recommend refilling drop per Dr. Wheeler's last visit note on 9/23/20: Continue Cosopt (dorzolamide-timolol -- dark blue top) twice a day both eyes.  Patient is past do for appointment, but has one scheduled 5/19/21.

## 2021-05-19 ENCOUNTER — OFFICE VISIT (OUTPATIENT)
Dept: OPHTHALMOLOGY | Facility: CLINIC | Age: 71
End: 2021-05-19
Payer: MEDICARE

## 2021-05-19 DIAGNOSIS — H40.1131 PRIMARY OPEN ANGLE GLAUCOMA OF BOTH EYES, MILD STAGE: Primary | ICD-10-CM

## 2021-05-19 DIAGNOSIS — H52.223 MYOPIA OF BOTH EYES WITH REGULAR ASTIGMATISM AND PRESBYOPIA: ICD-10-CM

## 2021-05-19 DIAGNOSIS — H52.4 MYOPIA OF BOTH EYES WITH REGULAR ASTIGMATISM AND PRESBYOPIA: ICD-10-CM

## 2021-05-19 DIAGNOSIS — H52.13 MYOPIA OF BOTH EYES WITH REGULAR ASTIGMATISM AND PRESBYOPIA: ICD-10-CM

## 2021-05-19 DIAGNOSIS — H25.813 COMBINED FORM OF AGE-RELATED CATARACT, BOTH EYES: ICD-10-CM

## 2021-05-19 DIAGNOSIS — H43.813 POSTERIOR VITREOUS DETACHMENT OF BOTH EYES: ICD-10-CM

## 2021-05-19 DIAGNOSIS — Z98.890 S/P LASIK SURGERY: ICD-10-CM

## 2021-05-19 PROCEDURE — 92014 COMPRE OPH EXAM EST PT 1/>: CPT | Performed by: STUDENT IN AN ORGANIZED HEALTH CARE EDUCATION/TRAINING PROGRAM

## 2021-05-19 ASSESSMENT — TONOMETRY
OS_IOP_MMHG: 17
OD_IOP_MMHG: 18
IOP_METHOD: APPLANATION

## 2021-05-19 ASSESSMENT — REFRACTION_MANIFEST
OD_AXIS: 060
OD_ADD: +3.00
OS_CYLINDER: +1.00
OS_ADD: +3.00
OS_AXIS: 110
OS_SPHERE: -1.50
OD_CYLINDER: +0.50
OD_SPHERE: -0.75

## 2021-05-19 ASSESSMENT — CONF VISUAL FIELD
OD_NORMAL: 1
OS_NORMAL: 1

## 2021-05-19 ASSESSMENT — VISUAL ACUITY
OD_SC: 20/40
METHOD: SNELLEN - LINEAR
OS_SC: 20/60-1

## 2021-05-19 ASSESSMENT — SLIT LAMP EXAM - LIDS
COMMENTS: 1+ DERMATOCHALASIS
COMMENTS: 1+ DERMATOCHALASIS

## 2021-05-19 ASSESSMENT — EXTERNAL EXAM - RIGHT EYE: OD_EXAM: NORMAL

## 2021-05-19 ASSESSMENT — CUP TO DISC RATIO
OS_RATIO: 0.65
OD_RATIO: 0.45

## 2021-05-19 ASSESSMENT — EXTERNAL EXAM - LEFT EYE: OS_EXAM: NORMAL

## 2021-05-19 NOTE — LETTER
5/19/2021         RE: Mona Richey  [address] [facility] [address]        Dear Colleague,    Thank you for referring your patient, Mona Richey, to the [facility]. Please see a copy of my visit note below.     Current Eye Medications: Continue Latanoprost  at bedtime both eyes, Cosopt and Brimonidine (purple top) twice a day in both eyes      Subjective:  IOP recheck, last seen 8 mos ago  Pt report that's she is really having a problem seeing at night when driving and  at end of day eyes feel tired at end of day and can get pressure feeling in eyes.   Pt states she really would like to have cataract surgery.     Objective:  See Ophthalmology Exam.       Assessment:  Mona Richey is a 71 year old female who presents with:   Encounter Diagnoses   Name Primary?     Primary open angle glaucoma of both eyes, mild stage Intraocular pressure 18/17 today. Discs appear stable both eyes. Timing of cataract surgery will help determine when to plan for glaucoma testing follow up.       Combined form of age-related cataract, both eyes Visually significant both eyes, left>right. Dil 8. Plan for ECP, CSC case (last of the block). S/p LASIK left eye with Dr. Best.      S/P LASIK surgery OS (2001)      Posterior vitreous detachment of both eyes      Myopia of both eyes with regular astigmatism and presbyopia      Visually significant cataract that is interfering with daily activities of living. Plan for cataract extraction and intraocular lens implant left eye, then right eye.  Risks, benefits, complications, and alternatives discussed with patient including possibility of limitations from coexistent eye disease and loss of vision. Target refraction and lens options discussed.  Patient understands and wishes to proceed with surgery.     Plan:  Continue Latanoprost (green top) at bedtime both eyes     Continue Brimonidine (purple top) twice a day both eyes     Continue Cosopt  (dorzolamide-timolol -- dark blue top) twice a day both eyes    Offered cataract surgery of the left eye then right eye at [facility] or the [facility]  We will call you to schedule if you wish to proceed    Lea Wheeler MD  [phone]            Again, thank you for allowing me to participate in the care of your patient.        Sincerely,        Lea Wheeler MD

## 2021-05-19 NOTE — PATIENT INSTRUCTIONS
Continue Latanoprost (green top) at bedtime both eyes     Continue Brimonidine (purple top) twice a day both eyes     Continue Cosopt (dorzolamide-timolol -- dark blue top) twice a day both eyes    Offered cataract surgery of the left eye then right eye at [facility] or the [facility]  We will call you to schedule if you wish to proceed    Lea Wheeler MD  [phone]

## 2021-05-19 NOTE — PROGRESS NOTES
Current Eye Medications: Continue Latanoprost  at bedtime both eyes, Cosopt and Brimonidine (purple top) twice a day in both eyes      Subjective:  IOP recheck, last seen 8 mos ago  Pt report that's she is really having a problem seeing at night when driving and  at end of day eyes feel tired at end of day and can get pressure feeling in eyes.   Pt states she really would like to have cataract surgery.     Objective:  See Ophthalmology Exam.       Assessment:  Mona Richey is a 71 year old female who presents with:   Encounter Diagnoses   Name Primary?     Primary open angle glaucoma of both eyes, mild stage Intraocular pressure 18/17 today. Discs appear stable both eyes. Timing of cataract surgery will help determine when to plan for glaucoma testing follow up.       Combined form of age-related cataract, both eyes Visually significant both eyes, left>right. Dil 8. Plan for ECP, CSC case (last of the block). S/p LASIK left eye with Dr. Best .Discussed possibility of refractive surprise.       S/P LASIK surgery OS (2001)      Posterior vitreous detachment of both eyes      Myopia of both eyes with regular astigmatism and presbyopia      Visually significant cataract that is interfering with daily activities of living. Plan for cataract extraction and intraocular lens implant left eye, then right eye.  Risks, benefits, complications, and alternatives discussed with patient including possibility of limitations from coexistent eye disease and loss of vision. Target refraction and lens options discussed.  Patient understands and wishes to proceed with surgery.     Plan:  Continue Latanoprost (green top) at bedtime both eyes     Continue Brimonidine (purple top) twice a day both eyes     Continue Cosopt (dorzolamide-timolol -- dark blue top) twice a day both eyes    Offered cataract surgery of the left eye then right eye at [facility] or the [facility]  We will call you to  schedule if you wish to proceed    Lea Wheeler MD  [phone]

## 2021-06-08 ENCOUNTER — TELEPHONE (OUTPATIENT)
Dept: OPHTHALMOLOGY | Facility: CLINIC | Age: 71
End: 2021-06-08

## 2021-06-08 NOTE — TELEPHONE ENCOUNTER
Patient called stating that they would like to go forward with the cataract surgery and need an order from Dr. Wheeler so they can schedule the appointment. They would like a call back to confirm at [phone] along with her spouse Sarath as soon as possible.    Thank You,    Gregorio Stokes  Patient Rep

## 2021-06-17 ENCOUNTER — PREP FOR PROCEDURE (OUTPATIENT)
Dept: OPHTHALMOLOGY | Facility: CLINIC | Age: 71
End: 2021-06-17

## 2021-06-17 DIAGNOSIS — H25.811 COMBINED FORM OF AGE-RELATED CATARACT, RIGHT EYE: ICD-10-CM

## 2021-06-17 DIAGNOSIS — H25.812 COMBINED FORM OF AGE-RELATED CATARACT, LEFT EYE: Primary | ICD-10-CM

## 2021-06-17 DIAGNOSIS — H40.1122 PRIMARY OPEN ANGLE GLAUCOMA (POAG) OF LEFT EYE, MODERATE STAGE: ICD-10-CM

## 2021-06-17 DIAGNOSIS — H40.1112 PRIMARY OPEN ANGLE GLAUCOMA (POAG) OF RIGHT EYE, MODERATE STAGE: ICD-10-CM

## 2021-06-18 ENCOUNTER — TELEPHONE (OUTPATIENT)
Dept: OPHTHALMOLOGY | Facility: CLINIC | Age: 71
End: 2021-06-18

## 2021-06-18 DIAGNOSIS — Z11.59 ENCOUNTER FOR SCREENING FOR OTHER VIRAL DISEASES: ICD-10-CM

## 2021-06-18 PROBLEM — H40.1112 PRIMARY OPEN ANGLE GLAUCOMA (POAG) OF RIGHT EYE, MODERATE STAGE: Status: ACTIVE | Noted: 2021-06-18

## 2021-06-18 PROBLEM — H25.812 COMBINED FORM OF AGE-RELATED CATARACT, LEFT EYE: Status: ACTIVE | Noted: 2021-06-18

## 2021-06-18 PROBLEM — H25.811 COMBINED FORM OF AGE-RELATED CATARACT, RIGHT EYE: Status: ACTIVE | Noted: 2021-06-18

## 2021-06-18 PROBLEM — H40.1122 PRIMARY OPEN ANGLE GLAUCOMA (POAG) OF LEFT EYE, MODERATE STAGE: Status: ACTIVE | Noted: 2021-06-18

## 2021-06-18 NOTE — TELEPHONE ENCOUNTER
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

## 2021-06-18 NOTE — TELEPHONE ENCOUNTER
Type of surgery: Phacoemulsification,cataract, clear corneal incision approach,with standard intraocular lens implant insertion and endoscopic cyclo photocoagulation right   CPT 87428, 56183, 10569   Primary open angle glaucoma of both eyes, mild stage H40.1131     Combined form of age-related cataract, both eyes H25.813    Location of surgery: Other: [facility]  Date and time of surgery: 08/02/2021  Surgeon: CARLITO  Pre-Op Appt Date: 07/12/2021  Post-Op Appt Date: 08/03/2021   Packet sent out: Yes  Pre-cert/Authorization completed:    No prior auth needed per Medicare online list    Per [employer]BS [place]: Medical Management is not applicable for Medicare Supplement plans. Please follow Medicare for covered services    Date: 06/18/21    Thank you,   Martha Araya   Prior Authorization [facility]  [phone]

## 2021-07-01 ENCOUNTER — OFFICE VISIT (OUTPATIENT)
Dept: OPHTHALMOLOGY | Facility: CLINIC | Age: 71
End: 2021-07-01
Payer: MEDICARE

## 2021-07-01 DIAGNOSIS — H40.1122 PRIMARY OPEN ANGLE GLAUCOMA (POAG) OF LEFT EYE, MODERATE STAGE: ICD-10-CM

## 2021-07-01 DIAGNOSIS — Z98.890 S/P LASIK SURGERY: ICD-10-CM

## 2021-07-01 DIAGNOSIS — H25.812 COMBINED FORM OF AGE-RELATED CATARACT, LEFT EYE: Primary | ICD-10-CM

## 2021-07-01 PROCEDURE — 92012 INTRM OPH EXAM EST PATIENT: CPT | Performed by: STUDENT IN AN ORGANIZED HEALTH CARE EDUCATION/TRAINING PROGRAM

## 2021-07-01 PROCEDURE — 92136 OPHTHALMIC BIOMETRY: CPT | Mod: 26 | Performed by: STUDENT IN AN ORGANIZED HEALTH CARE EDUCATION/TRAINING PROGRAM

## 2021-07-01 RX ORDER — MOXIFLOXACIN 5 MG/ML
1 SOLUTION/ DROPS OPHTHALMIC 4 TIMES DAILY
Qty: 3 ML | Refills: 0 | Status: SHIPPED | OUTPATIENT
Start: 2021-07-18 | End: 2021-10-27

## 2021-07-01 RX ORDER — KETOROLAC TROMETHAMINE 5 MG/ML
1 SOLUTION OPHTHALMIC 4 TIMES DAILY
Qty: 5 ML | Refills: 0 | Status: SHIPPED | OUTPATIENT
Start: 2021-07-18 | End: 2021-10-27

## 2021-07-01 RX ORDER — PREDNISOLONE ACETATE 10 MG/ML
1 SUSPENSION/ DROPS OPHTHALMIC 4 TIMES DAILY
Qty: 5 ML | Refills: 0 | Status: CANCELLED | OUTPATIENT
Start: 2021-07-20

## 2021-07-01 RX ORDER — PREDNISOLONE ACETATE 10 MG/ML
1 SUSPENSION/ DROPS OPHTHALMIC 4 TIMES DAILY
Qty: 10 ML | Refills: 0 | Status: SHIPPED | OUTPATIENT
Start: 2021-07-18 | End: 2021-10-27

## 2021-07-01 RX ORDER — KETOROLAC TROMETHAMINE 5 MG/ML
1 SOLUTION OPHTHALMIC 3 TIMES DAILY
Qty: 5 ML | Refills: 0 | Status: CANCELLED | OUTPATIENT
Start: 2021-07-18

## 2021-07-01 RX ORDER — MOXIFLOXACIN 5 MG/ML
1 SOLUTION/ DROPS OPHTHALMIC 4 TIMES DAILY
Qty: 5 ML | Refills: 0 | Status: CANCELLED | OUTPATIENT
Start: 2021-07-18

## 2021-07-01 ASSESSMENT — SLIT LAMP EXAM - LIDS
COMMENTS: 1+ DERMATOCHALASIS
COMMENTS: 1+ DERMATOCHALASIS

## 2021-07-01 ASSESSMENT — VISUAL ACUITY
OD_SC+: -2
OD_PH_SC: 20/60
OD_SC: 20/70
OD_PH_SC+: -1
OS_SC: 20/60
OS_SC+: +1
OS_PH_SC: 20/40
METHOD: SNELLEN - LINEAR
OS_PH_SC+: -1

## 2021-07-01 ASSESSMENT — EXTERNAL EXAM - RIGHT EYE: OD_EXAM: NORMAL

## 2021-07-01 ASSESSMENT — EXTERNAL EXAM - LEFT EYE: OS_EXAM: NORMAL

## 2021-07-01 NOTE — LETTER
"    7/1/2021         RE: Mona Richey  [address] [facility] [address]        Dear Colleague,    Thank you for referring your patient, Mona Richey, to the [facility]. Please see a copy of my visit note below.     Current Eye Medications:  Latanoprost at bedtime both eyes, Brimonidine and Cosopt BID both eyes     Subjective:  Here for preop left eye. Had Lasik in left eye only.  Scheduled for 7-19-21 at 930 am. Erythromycin allergy* thinks she would like to go the CatarActive3 route.  Has worn glasses since age [age] for \"bad astigmatism\", denies lazy eye or patching as a child.      Objective:  See Ophthalmology Exam.       Assessment:  Mona Richey is a 71 year old female who presents with:   Encounter Diagnoses   Name Primary?     Combined form of age-related cataract, left eye Cataract w/ECP pre-op left eye. Dil 8, s/p LASIK left eye. 3 drops. Target mostly distance left eye.       S/P LASIK surgery OS (2001)      Primary open angle glaucoma (POAG) of left eye, moderate stage        Plan:  PRE-OP CATARACT INSTRUCTIONS    * 3 eye drops from the pharmacy at least 3 days before surgery.    *Use the following drops in the left eye 4 times on the day before surgery and once the morning of surgery:                                 Vigamox or Ofloxacin (tan cap)   Ketorolac (gray cap)   Prednisolone (white or pink top) drops    * Continue all your glaucoma drops the same before and after surgery    *Please bring all your eyedrops to the surgery center.    *If taking more than one drop, wait five minutes between drops.    *No solid food or drink after midnight. Please take the starred medications (see attached sheets) with a small sip of water the morning of surgery.    Lea Wheeler MD  [phone]        Again, thank you for allowing me to participate in the care of your patient.        Sincerely,        Lea Wheeler MD    "

## 2021-07-01 NOTE — PATIENT INSTRUCTIONS
PRE-OP CATARACT INSTRUCTIONS    * 3 eye drops from the pharmacy at least 3 days before surgery.    *Use the following drops in the left eye 4 times on the day before surgery and once the morning of surgery:                                 Vigamox or Ofloxacin (tan cap)   Ketorolac (gray cap)   Prednisolone (white or pink top) drops    * Continue all your glaucoma drops the same before and after surgery    *Please bring all your eyedrops to the surgery center.    *If taking more than one drop, wait five minutes between drops.    *No solid food or drink after midnight. Please take the starred medications (see attached sheets) with a small sip of water the morning of surgery.    Lea Wheeler MD  [phone]

## 2021-07-01 NOTE — PROGRESS NOTES
" Current Eye Medications:  Latanoprost at bedtime both eyes, Brimonidine and Cosopt BID both eyes     Subjective:  Here for preop left eye. Had Lasik in left eye only.  Scheduled for 7-19-21 at 930 am. Erythromycin allergy* thinks she would like to go the CatarActive3 route.  Has worn glasses since age [age] for \"bad astigmatism\", denies lazy eye or patching as a child.      Objective:  See Ophthalmology Exam.       Assessment:  Mona Richey is a 71 year old female who presents with:   Encounter Diagnoses   Name Primary?     Combined form of age-related cataract, left eye Cataract w/ECP pre-op left eye. Dil 8, s/p LASIK left eye. 3 drops. Target mostly distance left eye.       S/P LASIK surgery OS (2001)      Primary open angle glaucoma (POAG) of left eye, moderate stage        Plan:  PRE-OP CATARACT INSTRUCTIONS    * 3 eye drops from the pharmacy at least 3 days before surgery.    *Use the following drops in the left eye 4 times on the day before surgery and once the morning of surgery:                                 Vigamox or Ofloxacin (tan cap)   Ketorolac (gray cap)   Prednisolone (white or pink top) drops    * Continue all your glaucoma drops the same before and after surgery    *Please bring all your eyedrops to the surgery center.    *If taking more than one drop, wait five minutes between drops.    *No solid food or drink after midnight. Please take the starred medications (see attached sheets) with a small sip of water the morning of surgery.    Lea Wheeler MD  [phone]    "

## 2021-07-08 NOTE — PATIENT INSTRUCTIONS

## 2021-07-08 NOTE — H&P (VIEW-ONLY)
[facility]  [address]  [place] [address]  Phone: [phone]  Primary Provider: John Douglas  Pre-op Performing Provider: SOL CEDENO      PREOPERATIVE EVALUATION:  Today's date: 7/12/2021    Mona Richey is a 71 year old female who presents for a preoperative evaluation.    Surgical Information:  Surgery/Procedure: RIGHT EYE PHACOEMULSIFICATION, CATARACT, CLEAR CORNEAL INCISION APPROACH, WITH STANDARD INTRAOCULAR LENS IMPLANT INSERTION AND ENDOSCOPIC CYCLOPHOTOCOAGULATION  Surgery Location: [facility]  Surgeon: Summer  Surgery Date: 08/02/2021  Time of Surgery: 9:45am  Where patient plans to recover: At home with family  Fax number for surgical facility: Note does not need to be faxed, will be available electronically in Epic.    Type of Anesthesia Anticipated: General    Assessment & Plan     The proposed surgical procedure is considered LOW risk.    Preop general physical exam  Normal     Essential hypertension  Stable     Chronic allergic rhinitis      Hyperlipidemia, unspecified hyperlipidemia type  Goes to [facility]           Risks and Recommendations:  The patient has the following additional risks and recommendations for perioperative complications:   - No identified additional risk factors other than previously addressed    Medication Instructions:  Patient is to take all scheduled medications on the day of surgery except stop aspirin 6 days before    RECOMMENDATION:  APPROVAL GIVEN to proceed with proposed procedure, without further diagnostic evaluation.    Review of external notes as documented above     Subjective     HPI related to upcoming procedure: Pt scheduled for cataract  Surgery and above      Preop Questions 7/12/2021   1. Have you ever had a heart attack or stroke? No   2. Have you ever had surgery on your heart or blood vessels, such as a stent placement, a coronary artery bypass, or surgery on an artery in your head, neck, heart, or legs? No   3. Do you  have chest pain with activity? No   4. Do you have a history of  heart failure? No   5. Do you currently have a cold, bronchitis or symptoms of other infection? No   6. Do you have a cough, shortness of breath, or wheezing? No   7. Do you or anyone in your family have previous history of blood clots? No   8. Do you or does anyone in your family have a serious bleeding problem such as prolonged bleeding following surgeries or cuts? No   9. Have you ever had problems with anemia or been told to take iron pills? No   10. Have you had any abnormal blood loss such as black, tarry or bloody stools, or abnormal vaginal bleeding? No   11. Have you ever had a blood transfusion? No   12. Are you willing to have a blood transfusion if it is medically needed before, during, or after your surgery? Yes   13. Have you or any of your relatives ever had problems with anesthesia? UNKNOWN -    14. Do you have sleep apnea, excessive snoring or daytime drowsiness? No   15. Do you have any artifical heart valves or other implanted medical devices like a pacemaker, defibrillator, or continuous glucose monitor? No   16. Do you have artificial joints? YES -    17. Are you allergic to latex? No     Health Care Directive:  Patient does not have a Health Care Directive or Living Will: Patient states has Advance Directive and will bring in a copy to clinic.    Preoperative Review of :   reviewed - no record of controlled substances prescribed.      Status of Chronic Conditions:  HYPERLIPIDEMIA - Patient has a long history of significant Hyperlipidemia requiring medication for treatment with recent good control. Patient reports no problems or side effects with the medication.     HYPERTENSION - Patient has longstanding history of HTN , currently denies any symptoms referable to elevated blood pressure. Specifically denies chest pain, palpitations, dyspnea, orthopnea, PND or peripheral edema. Blood pressure readings have been in normal  range. Current medication regimen is as listed below. Patient denies any side effects of medication.       Review of Systems  CONSTITUTIONAL: NEGATIVE for fever, chills, change in weight  INTEGUMENTARY/SKIN: NEGATIVE for worrisome rashes, moles or lesions  EYES: NEGATIVE for vision changes or irritation  ENT/MOUTH: NEGATIVE for ear, mouth and throat problems  RESP: NEGATIVE for significant cough or SOB  BREAST: NEGATIVE for masses, tenderness or discharge  CV: NEGATIVE for chest pain, palpitations or peripheral edema  GI: NEGATIVE for nausea, abdominal pain, heartburn, or change in bowel habits  : NEGATIVE for frequency, dysuria, or hematuria  MUSCULOSKELETAL: NEGATIVE for significant arthralgias or myalgia  NEURO: NEGATIVE for weakness, dizziness or paresthesias  ENDOCRINE: NEGATIVE for temperature intolerance, skin/hair changes  HEME: NEGATIVE for bleeding problems  PSYCHIATRIC: NEGATIVE for changes in mood or affect    Patient Active Problem List    Diagnosis Date Noted     Combined form of age-related cataract, left eye 06/18/2021     Priority: Medium     Added automatically from request for surgery [id number]       Primary open angle glaucoma (POAG) of left eye, moderate stage 06/18/2021     Priority: Medium     Added automatically from request for surgery [id number]       Combined form of age-related cataract, right eye 06/18/2021     Priority: Medium     Added automatically from request for surgery [id number]       Primary open angle glaucoma (POAG) of right eye, moderate stage 06/18/2021     Priority: Medium     Added automatically from request for surgery [id number]       Primary open angle glaucoma of both eyes, mild stage 12/19/2016     Priority: Medium     Target IOP:    Peak IOP:  23  OCT: nl right eye, thin sup OS  HVF:  Nl right eye, early inf arc OS  Pachymetry:  Thickish 571/565  C/D: 0.3/0.5  SLT:    Started latanoprost 9/2016 - decent response  Added Cosopt 6/2019       Combined form of age-related  cataract, both eyes 09/21/2016     Priority: Medium     PVD (posterior vitreous detachment) OU 07/06/2011     Priority: Medium      S/P LASIK surgery OS (2001) 07/06/2011     Priority: Medium      Past Medical History:   Diagnosis Date     Arthritis      Hypertension      Nonsenile cataract      Primary open angle glaucoma of both eyes, mild stage 12/19/2016     Past Surgical History:   Procedure Laterality Date     LASIK  aprox 2001 or earlier    LT eye only with BRAYDEN Best     Current Outpatient Medications   Medication Sig Dispense Refill     amLODIPine (NORVASC) 5 MG tablet Take 5 mg by mouth daily.       aspirin 81 MG tablet Take 1 tablet by mouth daily.       Atorvastatin Calcium (LIPITOR PO) Take  by mouth.       brimonidine (ALPHAGAN) 0.2 % ophthalmic solution Place 1 drop into both eyes 2 times daily 1 Bottle 11     Calcium-Vitamin D (CALCIUM + D PO) Take  by mouth.       celecoxib (CELEBREX) 200 MG capsule TK 1 C PO QD       cetirizine (ZYRTEC) 10 MG tablet Take 10 mg by mouth daily       dorzolamide-timolol (COSOPT) 2-0.5 % ophthalmic solution Place 1 drop into both eyes 2 times daily 20 mL 0     famotidine (PEPCID) 20 MG tablet        Fexofenadine HCl (ALLEGRA PO) Take  by mouth.       hydrochlorothiazide (HYDRODIURIL) 25 MG tablet        [START ON 7/18/2021] ketorolac (ACULAR) 0.5 % ophthalmic solution Place 1 drop Into the left eye 4 times daily 5 mL 0     latanoprost (XALATAN) 0.005 % ophthalmic solution Place 1 drop into both eyes At Bedtime 7.5 mL 4     Mometasone Furoate (NASONEX NA) Spray  in nostril.       [START ON 7/18/2021] moxifloxacin (VIGAMOX) 0.5 % ophthalmic solution Place 1 drop Into the left eye 4 times daily 3 mL 0     Multiple Vitamin (MULTI-VITAMIN PO) Take  by mouth.       Omega-3 Fatty Acids (FISH OIL PO) Take  by mouth.       [START ON 7/18/2021] prednisoLONE acetate (PRED FORTE) 1 % ophthalmic suspension Place 1 drop Into the left eye 4 times daily 10 mL 0     Raloxifene HCl (EVISTA  "PO) Take  by mouth.       vitamin E 400 UNIT capsule Take 1 capsule by mouth daily.         Allergies   Allergen Reactions     Erythromycin GI Disturbance     Ivp Dye [Contrast Dye] Other (See Comments)     Sneezing that wouldn't stop     Lisinopril Swelling     \"Ace inhibitor reaction\" Swelling of face and lips        Social History     Tobacco Use     Smoking status: Never Smoker     Smokeless tobacco: Never Used   Substance Use Topics     Alcohol use: Not on file     Family History   Problem Relation Age of Onset     Macular Degeneration Father      Macular Degeneration Mother      Stomach Cancer Maternal Grandfather      History   Drug Use Not on file         Objective     /78 (BP Location: Right arm, Patient Position: Sitting, Cuff Size: Adult Large)   Pulse 72   Temp 97.9  F (36.6  C) (Oral)   Ht 1.524 m (5')   Wt 77.6 kg (171 lb)   SpO2 99%   BMI 33.40 kg/m      Physical Exam    GENERAL APPEARANCE: healthy, alert and no distress     EYES: EOMI, PERRL     HENT: ear canals and TM's normal and nose and mouth without ulcers or lesions     NECK: no adenopathy, no asymmetry, masses, or scars and thyroid normal to palpation     RESP: lungs clear to auscultation - no rales, rhonchi or wheezes     CV: regular rates and rhythm, normal S1 S2, no S3 or S4 and no murmur, click or rub     ABDOMEN:  soft, nontender, no HSM or masses and bowel sounds normal     MS: extremities normal- no gross deformities noted, no evidence of inflammation in joints, FROM in all extremities.     SKIN: no suspicious lesions or rashes     NEURO: Normal strength and tone, sensory exam grossly normal, mentation intact and speech normal     PSYCH: mentation appears normal. and affect normal/bright     LYMPHATICS: No cervical adenopathy    No results for input(s): HGB, PLT, INR, NA, POTASSIUM, CR, A1C in the last 40028 hours.     Diagnostics:  No labs were ordered during this visit.   No EKG required for low risk surgery (cataract, " skin procedure, breast biopsy, etc).    Revised Cardiac Risk Index (RCRI):  The patient has the following serious cardiovascular risks for perioperative complications:   - No serious cardiac risks = 0 points     RCRI Interpretation: 0 points: Class I (very low risk - 0.4% complication rate)           Signed Electronically by: Rosanne Calixto MD  Copy of this evaluation report is provided to requesting physician.

## 2021-07-08 NOTE — H&P (VIEW-ONLY)
[facility]  [address]  [place] [address]  Phone: [phone]  Primary Provider: John Douglas  Pre-op Performing Provider: SOL CEDENO      PREOPERATIVE EVALUATION:  Today's date: 7/12/2021    Mona Richey is a 71 year old female who presents for a preoperative evaluation.    Surgical Information:  Surgery/Procedure: RIGHT EYE PHACOEMULSIFICATION, CATARACT, CLEAR CORNEAL INCISION APPROACH, WITH STANDARD INTRAOCULAR LENS IMPLANT INSERTION AND ENDOSCOPIC CYCLOPHOTOCOAGULATION  Surgery Location: [facility]  Surgeon: Summer  Surgery Date: 08/02/2021  Time of Surgery: 9:45am  Where patient plans to recover: At home with family  Fax number for surgical facility: Note does not need to be faxed, will be available electronically in Epic.    Type of Anesthesia Anticipated: General    Assessment & Plan     The proposed surgical procedure is considered LOW risk.    Preop general physical exam  Normal     Essential hypertension  Stable     Chronic allergic rhinitis      Hyperlipidemia, unspecified hyperlipidemia type  Goes to [facility]           Risks and Recommendations:  The patient has the following additional risks and recommendations for perioperative complications:   - No identified additional risk factors other than previously addressed    Medication Instructions:  Patient is to take all scheduled medications on the day of surgery except stop aspirin 6 days before    RECOMMENDATION:  APPROVAL GIVEN to proceed with proposed procedure, without further diagnostic evaluation.    Review of external notes as documented above     Subjective     HPI related to upcoming procedure: Pt scheduled for cataract  Surgery and above      Preop Questions 7/12/2021   1. Have you ever had a heart attack or stroke? No   2. Have you ever had surgery on your heart or blood vessels, such as a stent placement, a coronary artery bypass, or surgery on an artery in your head, neck, heart, or legs? No   3. Do you  have chest pain with activity? No   4. Do you have a history of  heart failure? No   5. Do you currently have a cold, bronchitis or symptoms of other infection? No   6. Do you have a cough, shortness of breath, or wheezing? No   7. Do you or anyone in your family have previous history of blood clots? No   8. Do you or does anyone in your family have a serious bleeding problem such as prolonged bleeding following surgeries or cuts? No   9. Have you ever had problems with anemia or been told to take iron pills? No   10. Have you had any abnormal blood loss such as black, tarry or bloody stools, or abnormal vaginal bleeding? No   11. Have you ever had a blood transfusion? No   12. Are you willing to have a blood transfusion if it is medically needed before, during, or after your surgery? Yes   13. Have you or any of your relatives ever had problems with anesthesia? UNKNOWN -    14. Do you have sleep apnea, excessive snoring or daytime drowsiness? No   15. Do you have any artifical heart valves or other implanted medical devices like a pacemaker, defibrillator, or continuous glucose monitor? No   16. Do you have artificial joints? YES -    17. Are you allergic to latex? No     Health Care Directive:  Patient does not have a Health Care Directive or Living Will: Patient states has Advance Directive and will bring in a copy to clinic.    Preoperative Review of :   reviewed - no record of controlled substances prescribed.      Status of Chronic Conditions:  HYPERLIPIDEMIA - Patient has a long history of significant Hyperlipidemia requiring medication for treatment with recent good control. Patient reports no problems or side effects with the medication.     HYPERTENSION - Patient has longstanding history of HTN , currently denies any symptoms referable to elevated blood pressure. Specifically denies chest pain, palpitations, dyspnea, orthopnea, PND or peripheral edema. Blood pressure readings have been in normal  range. Current medication regimen is as listed below. Patient denies any side effects of medication.       Review of Systems  CONSTITUTIONAL: NEGATIVE for fever, chills, change in weight  INTEGUMENTARY/SKIN: NEGATIVE for worrisome rashes, moles or lesions  EYES: NEGATIVE for vision changes or irritation  ENT/MOUTH: NEGATIVE for ear, mouth and throat problems  RESP: NEGATIVE for significant cough or SOB  BREAST: NEGATIVE for masses, tenderness or discharge  CV: NEGATIVE for chest pain, palpitations or peripheral edema  GI: NEGATIVE for nausea, abdominal pain, heartburn, or change in bowel habits  : NEGATIVE for frequency, dysuria, or hematuria  MUSCULOSKELETAL: NEGATIVE for significant arthralgias or myalgia  NEURO: NEGATIVE for weakness, dizziness or paresthesias  ENDOCRINE: NEGATIVE for temperature intolerance, skin/hair changes  HEME: NEGATIVE for bleeding problems  PSYCHIATRIC: NEGATIVE for changes in mood or affect    Patient Active Problem List    Diagnosis Date Noted     Combined form of age-related cataract, left eye 06/18/2021     Priority: Medium     Added automatically from request for surgery [id number]       Primary open angle glaucoma (POAG) of left eye, moderate stage 06/18/2021     Priority: Medium     Added automatically from request for surgery [id number]       Combined form of age-related cataract, right eye 06/18/2021     Priority: Medium     Added automatically from request for surgery [id number]       Primary open angle glaucoma (POAG) of right eye, moderate stage 06/18/2021     Priority: Medium     Added automatically from request for surgery [id number]       Primary open angle glaucoma of both eyes, mild stage 12/19/2016     Priority: Medium     Target IOP:    Peak IOP:  23  OCT: nl right eye, thin sup OS  HVF:  Nl right eye, early inf arc OS  Pachymetry:  Thickish 571/565  C/D: 0.3/0.5  SLT:    Started latanoprost 9/2016 - decent response  Added Cosopt 6/2019       Combined form of age-related  cataract, both eyes 09/21/2016     Priority: Medium     PVD (posterior vitreous detachment) OU 07/06/2011     Priority: Medium      S/P LASIK surgery OS (2001) 07/06/2011     Priority: Medium      Past Medical History:   Diagnosis Date     Arthritis      Hypertension      Nonsenile cataract      Primary open angle glaucoma of both eyes, mild stage 12/19/2016     Past Surgical History:   Procedure Laterality Date     LASIK  aprox 2001 or earlier    LT eye only with BRAYDEN Best     Current Outpatient Medications   Medication Sig Dispense Refill     amLODIPine (NORVASC) 5 MG tablet Take 5 mg by mouth daily.       aspirin 81 MG tablet Take 1 tablet by mouth daily.       Atorvastatin Calcium (LIPITOR PO) Take  by mouth.       brimonidine (ALPHAGAN) 0.2 % ophthalmic solution Place 1 drop into both eyes 2 times daily 1 Bottle 11     Calcium-Vitamin D (CALCIUM + D PO) Take  by mouth.       celecoxib (CELEBREX) 200 MG capsule TK 1 C PO QD       cetirizine (ZYRTEC) 10 MG tablet Take 10 mg by mouth daily       dorzolamide-timolol (COSOPT) 2-0.5 % ophthalmic solution Place 1 drop into both eyes 2 times daily 20 mL 0     famotidine (PEPCID) 20 MG tablet        Fexofenadine HCl (ALLEGRA PO) Take  by mouth.       hydrochlorothiazide (HYDRODIURIL) 25 MG tablet        [START ON 7/18/2021] ketorolac (ACULAR) 0.5 % ophthalmic solution Place 1 drop Into the left eye 4 times daily 5 mL 0     latanoprost (XALATAN) 0.005 % ophthalmic solution Place 1 drop into both eyes At Bedtime 7.5 mL 4     Mometasone Furoate (NASONEX NA) Spray  in nostril.       [START ON 7/18/2021] moxifloxacin (VIGAMOX) 0.5 % ophthalmic solution Place 1 drop Into the left eye 4 times daily 3 mL 0     Multiple Vitamin (MULTI-VITAMIN PO) Take  by mouth.       Omega-3 Fatty Acids (FISH OIL PO) Take  by mouth.       [START ON 7/18/2021] prednisoLONE acetate (PRED FORTE) 1 % ophthalmic suspension Place 1 drop Into the left eye 4 times daily 10 mL 0     Raloxifene HCl (EVISTA  "PO) Take  by mouth.       vitamin E 400 UNIT capsule Take 1 capsule by mouth daily.         Allergies   Allergen Reactions     Erythromycin GI Disturbance     Ivp Dye [Contrast Dye] Other (See Comments)     Sneezing that wouldn't stop     Lisinopril Swelling     \"Ace inhibitor reaction\" Swelling of face and lips        Social History     Tobacco Use     Smoking status: Never Smoker     Smokeless tobacco: Never Used   Substance Use Topics     Alcohol use: Not on file     Family History   Problem Relation Age of Onset     Macular Degeneration Father      Macular Degeneration Mother      Stomach Cancer Maternal Grandfather      History   Drug Use Not on file         Objective     /78 (BP Location: Right arm, Patient Position: Sitting, Cuff Size: Adult Large)   Pulse 72   Temp 97.9  F (36.6  C) (Oral)   Ht 1.524 m (5')   Wt 77.6 kg (171 lb)   SpO2 99%   BMI 33.40 kg/m      Physical Exam    GENERAL APPEARANCE: healthy, alert and no distress     EYES: EOMI, PERRL     HENT: ear canals and TM's normal and nose and mouth without ulcers or lesions     NECK: no adenopathy, no asymmetry, masses, or scars and thyroid normal to palpation     RESP: lungs clear to auscultation - no rales, rhonchi or wheezes     CV: regular rates and rhythm, normal S1 S2, no S3 or S4 and no murmur, click or rub     ABDOMEN:  soft, nontender, no HSM or masses and bowel sounds normal     MS: extremities normal- no gross deformities noted, no evidence of inflammation in joints, FROM in all extremities.     SKIN: no suspicious lesions or rashes     NEURO: Normal strength and tone, sensory exam grossly normal, mentation intact and speech normal     PSYCH: mentation appears normal. and affect normal/bright     LYMPHATICS: No cervical adenopathy    No results for input(s): HGB, PLT, INR, NA, POTASSIUM, CR, A1C in the last 89862 hours.     Diagnostics:  No labs were ordered during this visit.   No EKG required for low risk surgery (cataract, " skin procedure, breast biopsy, etc).    Revised Cardiac Risk Index (RCRI):  The patient has the following serious cardiovascular risks for perioperative complications:   - No serious cardiac risks = 0 points     RCRI Interpretation: 0 points: Class I (very low risk - 0.4% complication rate)           Signed Electronically by: Rosanne Calixto MD  Copy of this evaluation report is provided to requesting physician.

## 2021-07-08 NOTE — PROGRESS NOTES
[facility]  [address]  [place] [address]  Phone: [phone]  Primary Provider: John Douglas  Pre-op Performing Provider: SOL CEDENO      PREOPERATIVE EVALUATION:  Today's date: 7/12/2021    Mona Richey is a 71 year old female who presents for a preoperative evaluation.    Surgical Information:  Surgery/Procedure: RIGHT EYE PHACOEMULSIFICATION, CATARACT, CLEAR CORNEAL INCISION APPROACH, WITH STANDARD INTRAOCULAR LENS IMPLANT INSERTION AND ENDOSCOPIC CYCLOPHOTOCOAGULATION  Surgery Location: [facility]  Surgeon: Summer  Surgery Date: 08/02/2021  Time of Surgery: 9:45am  Where patient plans to recover: At home with family  Fax number for surgical facility: Note does not need to be faxed, will be available electronically in Epic.    Type of Anesthesia Anticipated: General    Assessment & Plan     The proposed surgical procedure is considered LOW risk.    Preop general physical exam  Normal     Essential hypertension  Stable     Chronic allergic rhinitis      Hyperlipidemia, unspecified hyperlipidemia type  Goes to [facility]           Risks and Recommendations:  The patient has the following additional risks and recommendations for perioperative complications:   - No identified additional risk factors other than previously addressed    Medication Instructions:  Patient is to take all scheduled medications on the day of surgery except stop aspirin 6 days before    RECOMMENDATION:  APPROVAL GIVEN to proceed with proposed procedure, without further diagnostic evaluation.    Review of external notes as documented above     Subjective     HPI related to upcoming procedure: Pt scheduled for cataract  Surgery and above      Preop Questions 7/12/2021   1. Have you ever had a heart attack or stroke? No   2. Have you ever had surgery on your heart or blood vessels, such as a stent placement, a coronary artery bypass, or surgery on an artery in your head, neck, heart, or legs? No   3. Do you  have chest pain with activity? No   4. Do you have a history of  heart failure? No   5. Do you currently have a cold, bronchitis or symptoms of other infection? No   6. Do you have a cough, shortness of breath, or wheezing? No   7. Do you or anyone in your family have previous history of blood clots? No   8. Do you or does anyone in your family have a serious bleeding problem such as prolonged bleeding following surgeries or cuts? No   9. Have you ever had problems with anemia or been told to take iron pills? No   10. Have you had any abnormal blood loss such as black, tarry or bloody stools, or abnormal vaginal bleeding? No   11. Have you ever had a blood transfusion? No   12. Are you willing to have a blood transfusion if it is medically needed before, during, or after your surgery? Yes   13. Have you or any of your relatives ever had problems with anesthesia? UNKNOWN -    14. Do you have sleep apnea, excessive snoring or daytime drowsiness? No   15. Do you have any artifical heart valves or other implanted medical devices like a pacemaker, defibrillator, or continuous glucose monitor? No   16. Do you have artificial joints? YES -    17. Are you allergic to latex? No     Health Care Directive:  Patient does not have a Health Care Directive or Living Will: Patient states has Advance Directive and will bring in a copy to clinic.    Preoperative Review of :   reviewed - no record of controlled substances prescribed.      Status of Chronic Conditions:  HYPERLIPIDEMIA - Patient has a long history of significant Hyperlipidemia requiring medication for treatment with recent good control. Patient reports no problems or side effects with the medication.     HYPERTENSION - Patient has longstanding history of HTN , currently denies any symptoms referable to elevated blood pressure. Specifically denies chest pain, palpitations, dyspnea, orthopnea, PND or peripheral edema. Blood pressure readings have been in normal  range. Current medication regimen is as listed below. Patient denies any side effects of medication.       Review of Systems  CONSTITUTIONAL: NEGATIVE for fever, chills, change in weight  INTEGUMENTARY/SKIN: NEGATIVE for worrisome rashes, moles or lesions  EYES: NEGATIVE for vision changes or irritation  ENT/MOUTH: NEGATIVE for ear, mouth and throat problems  RESP: NEGATIVE for significant cough or SOB  BREAST: NEGATIVE for masses, tenderness or discharge  CV: NEGATIVE for chest pain, palpitations or peripheral edema  GI: NEGATIVE for nausea, abdominal pain, heartburn, or change in bowel habits  : NEGATIVE for frequency, dysuria, or hematuria  MUSCULOSKELETAL: NEGATIVE for significant arthralgias or myalgia  NEURO: NEGATIVE for weakness, dizziness or paresthesias  ENDOCRINE: NEGATIVE for temperature intolerance, skin/hair changes  HEME: NEGATIVE for bleeding problems  PSYCHIATRIC: NEGATIVE for changes in mood or affect    Patient Active Problem List    Diagnosis Date Noted     Combined form of age-related cataract, left eye 06/18/2021     Priority: Medium     Added automatically from request for surgery [id number]       Primary open angle glaucoma (POAG) of left eye, moderate stage 06/18/2021     Priority: Medium     Added automatically from request for surgery [id number]       Combined form of age-related cataract, right eye 06/18/2021     Priority: Medium     Added automatically from request for surgery [id number]       Primary open angle glaucoma (POAG) of right eye, moderate stage 06/18/2021     Priority: Medium     Added automatically from request for surgery [id number]       Primary open angle glaucoma of both eyes, mild stage 12/19/2016     Priority: Medium     Target IOP:    Peak IOP:  23  OCT: nl right eye, thin sup OS  HVF:  Nl right eye, early inf arc OS  Pachymetry:  Thickish 571/565  C/D: 0.3/0.5  SLT:    Started latanoprost 9/2016 - decent response  Added Cosopt 6/2019       Combined form of age-related  cataract, both eyes 09/21/2016     Priority: Medium     PVD (posterior vitreous detachment) OU 07/06/2011     Priority: Medium      S/P LASIK surgery OS (2001) 07/06/2011     Priority: Medium      Past Medical History:   Diagnosis Date     Arthritis      Hypertension      Nonsenile cataract      Primary open angle glaucoma of both eyes, mild stage 12/19/2016     Past Surgical History:   Procedure Laterality Date     LASIK  aprox 2001 or earlier    LT eye only with BRAYDEN Best     Current Outpatient Medications   Medication Sig Dispense Refill     amLODIPine (NORVASC) 5 MG tablet Take 5 mg by mouth daily.       aspirin 81 MG tablet Take 1 tablet by mouth daily.       Atorvastatin Calcium (LIPITOR PO) Take  by mouth.       brimonidine (ALPHAGAN) 0.2 % ophthalmic solution Place 1 drop into both eyes 2 times daily 1 Bottle 11     Calcium-Vitamin D (CALCIUM + D PO) Take  by mouth.       celecoxib (CELEBREX) 200 MG capsule TK 1 C PO QD       cetirizine (ZYRTEC) 10 MG tablet Take 10 mg by mouth daily       dorzolamide-timolol (COSOPT) 2-0.5 % ophthalmic solution Place 1 drop into both eyes 2 times daily 20 mL 0     famotidine (PEPCID) 20 MG tablet        Fexofenadine HCl (ALLEGRA PO) Take  by mouth.       hydrochlorothiazide (HYDRODIURIL) 25 MG tablet        [START ON 7/18/2021] ketorolac (ACULAR) 0.5 % ophthalmic solution Place 1 drop Into the left eye 4 times daily 5 mL 0     latanoprost (XALATAN) 0.005 % ophthalmic solution Place 1 drop into both eyes At Bedtime 7.5 mL 4     Mometasone Furoate (NASONEX NA) Spray  in nostril.       [START ON 7/18/2021] moxifloxacin (VIGAMOX) 0.5 % ophthalmic solution Place 1 drop Into the left eye 4 times daily 3 mL 0     Multiple Vitamin (MULTI-VITAMIN PO) Take  by mouth.       Omega-3 Fatty Acids (FISH OIL PO) Take  by mouth.       [START ON 7/18/2021] prednisoLONE acetate (PRED FORTE) 1 % ophthalmic suspension Place 1 drop Into the left eye 4 times daily 10 mL 0     Raloxifene HCl (EVISTA  "PO) Take  by mouth.       vitamin E 400 UNIT capsule Take 1 capsule by mouth daily.         Allergies   Allergen Reactions     Erythromycin GI Disturbance     Ivp Dye [Contrast Dye] Other (See Comments)     Sneezing that wouldn't stop     Lisinopril Swelling     \"Ace inhibitor reaction\" Swelling of face and lips        Social History     Tobacco Use     Smoking status: Never Smoker     Smokeless tobacco: Never Used   Substance Use Topics     Alcohol use: Not on file     Family History   Problem Relation Age of Onset     Macular Degeneration Father      Macular Degeneration Mother      Stomach Cancer Maternal Grandfather      History   Drug Use Not on file         Objective     /78 (BP Location: Right arm, Patient Position: Sitting, Cuff Size: Adult Large)   Pulse 72   Temp 97.9  F (36.6  C) (Oral)   Ht 1.524 m (5')   Wt 77.6 kg (171 lb)   SpO2 99%   BMI 33.40 kg/m      Physical Exam    GENERAL APPEARANCE: healthy, alert and no distress     EYES: EOMI, PERRL     HENT: ear canals and TM's normal and nose and mouth without ulcers or lesions     NECK: no adenopathy, no asymmetry, masses, or scars and thyroid normal to palpation     RESP: lungs clear to auscultation - no rales, rhonchi or wheezes     CV: regular rates and rhythm, normal S1 S2, no S3 or S4 and no murmur, click or rub     ABDOMEN:  soft, nontender, no HSM or masses and bowel sounds normal     MS: extremities normal- no gross deformities noted, no evidence of inflammation in joints, FROM in all extremities.     SKIN: no suspicious lesions or rashes     NEURO: Normal strength and tone, sensory exam grossly normal, mentation intact and speech normal     PSYCH: mentation appears normal. and affect normal/bright     LYMPHATICS: No cervical adenopathy    No results for input(s): HGB, PLT, INR, NA, POTASSIUM, CR, A1C in the last 09388 hours.     Diagnostics:  No labs were ordered during this visit.   No EKG required for low risk surgery (cataract, " skin procedure, breast biopsy, etc).    Revised Cardiac Risk Index (RCRI):  The patient has the following serious cardiovascular risks for perioperative complications:   - No serious cardiac risks = 0 points     RCRI Interpretation: 0 points: Class I (very low risk - 0.4% complication rate)           Signed Electronically by: Rosanne Calixto MD  Copy of this evaluation report is provided to requesting physician.

## 2021-07-12 ENCOUNTER — OFFICE VISIT (OUTPATIENT)
Dept: FAMILY MEDICINE | Facility: CLINIC | Age: 71
End: 2021-07-12
Payer: MEDICARE

## 2021-07-12 VITALS
HEART RATE: 72 BPM | TEMPERATURE: 97.9 F | OXYGEN SATURATION: 99 % | SYSTOLIC BLOOD PRESSURE: 120 MMHG | DIASTOLIC BLOOD PRESSURE: 78 MMHG | HEIGHT: 60 IN | WEIGHT: 171 LBS | BODY MASS INDEX: 33.57 KG/M2

## 2021-07-12 DIAGNOSIS — J30.9 CHRONIC ALLERGIC RHINITIS: ICD-10-CM

## 2021-07-12 DIAGNOSIS — Z01.818 PREOP GENERAL PHYSICAL EXAM: Primary | ICD-10-CM

## 2021-07-12 DIAGNOSIS — I10 ESSENTIAL HYPERTENSION: ICD-10-CM

## 2021-07-12 DIAGNOSIS — E78.5 HYPERLIPIDEMIA, UNSPECIFIED HYPERLIPIDEMIA TYPE: ICD-10-CM

## 2021-07-12 PROBLEM — M85.80 OSTEOPENIA: Status: ACTIVE | Noted: 2021-07-12

## 2021-07-12 PROCEDURE — 99214 OFFICE O/P EST MOD 30 MIN: CPT | Performed by: FAMILY MEDICINE

## 2021-07-12 ASSESSMENT — MIFFLIN-ST. JEOR: SCORE: 1207.15

## 2021-07-16 ENCOUNTER — LAB (OUTPATIENT)
Dept: LAB | Facility: CLINIC | Age: 71
End: 2021-07-16
Payer: MEDICARE

## 2021-07-16 ENCOUNTER — ANESTHESIA EVENT (OUTPATIENT)
Dept: SURGERY | Facility: AMBULATORY SURGERY CENTER | Age: 71
End: 2021-07-16
Payer: MEDICARE

## 2021-07-16 DIAGNOSIS — Z11.59 ENCOUNTER FOR SCREENING FOR OTHER VIRAL DISEASES: ICD-10-CM

## 2021-07-16 LAB — SARS-COV-2 RNA RESP QL NAA+PROBE: NEGATIVE

## 2021-07-16 PROCEDURE — U0005 INFEC AGEN DETEC AMPLI PROBE: HCPCS

## 2021-07-16 PROCEDURE — U0003 INFECTIOUS AGENT DETECTION BY NUCLEIC ACID (DNA OR RNA); SEVERE ACUTE RESPIRATORY SYNDROME CORONAVIRUS 2 (SARS-COV-2) (CORONAVIRUS DISEASE [COVID-19]), AMPLIFIED PROBE TECHNIQUE, MAKING USE OF HIGH THROUGHPUT TECHNOLOGIES AS DESCRIBED BY CMS-2020-01-R: HCPCS

## 2021-07-19 ENCOUNTER — HOSPITAL ENCOUNTER (OUTPATIENT)
Facility: AMBULATORY SURGERY CENTER | Age: 71
End: 2021-07-19
Attending: STUDENT IN AN ORGANIZED HEALTH CARE EDUCATION/TRAINING PROGRAM
Payer: MEDICARE

## 2021-07-19 ENCOUNTER — ANESTHESIA (OUTPATIENT)
Dept: SURGERY | Facility: AMBULATORY SURGERY CENTER | Age: 71
End: 2021-07-19
Payer: MEDICARE

## 2021-07-19 VITALS
RESPIRATION RATE: 18 BRPM | WEIGHT: 171 LBS | HEART RATE: 63 BPM | HEIGHT: 60 IN | BODY MASS INDEX: 33.57 KG/M2 | OXYGEN SATURATION: 97 % | DIASTOLIC BLOOD PRESSURE: 57 MMHG | SYSTOLIC BLOOD PRESSURE: 120 MMHG | TEMPERATURE: 97.2 F

## 2021-07-19 DIAGNOSIS — H40.1122 PRIMARY OPEN ANGLE GLAUCOMA (POAG) OF LEFT EYE, MODERATE STAGE: ICD-10-CM

## 2021-07-19 DIAGNOSIS — H25.812 COMBINED FORM OF AGE-RELATED CATARACT, LEFT EYE: ICD-10-CM

## 2021-07-19 PROCEDURE — 66988 XCAPSL CTRC RMVL W/ECP: CPT | Mod: LT

## 2021-07-19 DEVICE — EYE IMP IOL AMO PCL TECNIS ZCB00 19.5: Type: IMPLANTABLE DEVICE | Site: EYE | Status: FUNCTIONAL

## 2021-07-19 RX ORDER — MOXIFLOXACIN IN NACL,ISO-OS/PF 0.3MG/0.3
SYRINGE (ML) INTRAOCULAR PRN
Status: DISCONTINUED | OUTPATIENT
Start: 2021-07-19 | End: 2021-07-19 | Stop reason: HOSPADM

## 2021-07-19 RX ORDER — ONDANSETRON 4 MG/1
4 TABLET, ORALLY DISINTEGRATING ORAL EVERY 30 MIN PRN
Status: DISCONTINUED | OUTPATIENT
Start: 2021-07-19 | End: 2021-07-20 | Stop reason: HOSPADM

## 2021-07-19 RX ORDER — MEPERIDINE HYDROCHLORIDE 25 MG/ML
12.5 INJECTION INTRAMUSCULAR; INTRAVENOUS; SUBCUTANEOUS
Status: DISCONTINUED | OUTPATIENT
Start: 2021-07-19 | End: 2021-07-20 | Stop reason: HOSPADM

## 2021-07-19 RX ORDER — HYDROMORPHONE HYDROCHLORIDE 1 MG/ML
0.2 INJECTION, SOLUTION INTRAMUSCULAR; INTRAVENOUS; SUBCUTANEOUS EVERY 5 MIN PRN
Status: ACTIVE | OUTPATIENT
Start: 2021-07-19 | End: 2021-07-19

## 2021-07-19 RX ORDER — PROPARACAINE HYDROCHLORIDE 5 MG/ML
1 SOLUTION/ DROPS OPHTHALMIC ONCE
Status: COMPLETED | OUTPATIENT
Start: 2021-07-19 | End: 2021-07-19

## 2021-07-19 RX ORDER — ACETAZOLAMIDE 500 MG/1
500 CAPSULE, EXTENDED RELEASE ORAL 2 TIMES DAILY
Qty: 4 CAPSULE | Refills: 0 | Status: SHIPPED | OUTPATIENT
Start: 2021-07-19 | End: 2021-08-03

## 2021-07-19 RX ORDER — ACETAMINOPHEN 325 MG/1
975 TABLET ORAL ONCE
Status: COMPLETED | OUTPATIENT
Start: 2021-07-19 | End: 2021-07-19

## 2021-07-19 RX ORDER — ONDANSETRON 2 MG/ML
4 INJECTION INTRAMUSCULAR; INTRAVENOUS EVERY 30 MIN PRN
Status: DISCONTINUED | OUTPATIENT
Start: 2021-07-19 | End: 2021-07-20 | Stop reason: HOSPADM

## 2021-07-19 RX ORDER — OXYCODONE HCL 5 MG/5 ML
5 SOLUTION, ORAL ORAL EVERY 4 HOURS PRN
Status: DISCONTINUED | OUTPATIENT
Start: 2021-07-19 | End: 2021-07-20 | Stop reason: HOSPADM

## 2021-07-19 RX ORDER — SODIUM CHLORIDE, SODIUM LACTATE, POTASSIUM CHLORIDE, CALCIUM CHLORIDE 600; 310; 30; 20 MG/100ML; MG/100ML; MG/100ML; MG/100ML
INJECTION, SOLUTION INTRAVENOUS CONTINUOUS
Status: DISCONTINUED | OUTPATIENT
Start: 2021-07-19 | End: 2021-07-20 | Stop reason: HOSPADM

## 2021-07-19 RX ORDER — LABETALOL HYDROCHLORIDE 5 MG/ML
5 INJECTION, SOLUTION INTRAVENOUS
Status: DISCONTINUED | OUTPATIENT
Start: 2021-07-19 | End: 2021-07-20 | Stop reason: HOSPADM

## 2021-07-19 RX ORDER — SODIUM CHLORIDE, SODIUM LACTATE, POTASSIUM CHLORIDE, CALCIUM CHLORIDE 600; 310; 30; 20 MG/100ML; MG/100ML; MG/100ML; MG/100ML
500 INJECTION, SOLUTION INTRAVENOUS CONTINUOUS
Status: DISCONTINUED | OUTPATIENT
Start: 2021-07-19 | End: 2021-07-20 | Stop reason: HOSPADM

## 2021-07-19 RX ORDER — SODIUM CHLORIDE, SODIUM LACTATE, POTASSIUM CHLORIDE, CALCIUM CHLORIDE 600; 310; 30; 20 MG/100ML; MG/100ML; MG/100ML; MG/100ML
INJECTION, SOLUTION INTRAVENOUS CONTINUOUS
Status: CANCELLED | OUTPATIENT
Start: 2021-07-19

## 2021-07-19 RX ORDER — HYDRALAZINE HYDROCHLORIDE 20 MG/ML
5 INJECTION INTRAMUSCULAR; INTRAVENOUS EVERY 10 MIN PRN
Status: DISCONTINUED | OUTPATIENT
Start: 2021-07-19 | End: 2021-07-20 | Stop reason: HOSPADM

## 2021-07-19 RX ORDER — CYCLOPENTOLAT/TROPIC/PHENYLEPH 1%-1%-2.5%
1 DROPS (EA) OPHTHALMIC (EYE)
Status: COMPLETED | OUTPATIENT
Start: 2021-07-19 | End: 2021-07-19

## 2021-07-19 RX ORDER — LIDOCAINE 40 MG/G
CREAM TOPICAL
Status: DISCONTINUED | OUTPATIENT
Start: 2021-07-19 | End: 2021-07-20 | Stop reason: HOSPADM

## 2021-07-19 RX ORDER — TETRACAINE HYDROCHLORIDE 5 MG/ML
SOLUTION OPHTHALMIC PRN
Status: DISCONTINUED | OUTPATIENT
Start: 2021-07-19 | End: 2021-07-19 | Stop reason: HOSPADM

## 2021-07-19 RX ORDER — FENTANYL CITRATE 50 UG/ML
25 INJECTION, SOLUTION INTRAMUSCULAR; INTRAVENOUS EVERY 5 MIN PRN
Status: ACTIVE | OUTPATIENT
Start: 2021-07-19 | End: 2021-07-19

## 2021-07-19 RX ORDER — BALANCED SALT SOLUTION 6.4; .75; .48; .3; 3.9; 1.7 MG/ML; MG/ML; MG/ML; MG/ML; MG/ML; MG/ML
SOLUTION OPHTHALMIC PRN
Status: DISCONTINUED | OUTPATIENT
Start: 2021-07-19 | End: 2021-07-19 | Stop reason: HOSPADM

## 2021-07-19 RX ADMIN — ACETAMINOPHEN 975 MG: 325 TABLET ORAL at 09:05

## 2021-07-19 RX ADMIN — Medication 1 DROP: at 08:40

## 2021-07-19 RX ADMIN — SODIUM CHLORIDE, SODIUM LACTATE, POTASSIUM CHLORIDE, CALCIUM CHLORIDE 500 ML: 600; 310; 30; 20 INJECTION, SOLUTION INTRAVENOUS at 09:05

## 2021-07-19 RX ADMIN — PROPARACAINE HYDROCHLORIDE 1 DROP: 5 SOLUTION/ DROPS OPHTHALMIC at 08:39

## 2021-07-19 RX ADMIN — Medication 1 DROP: at 08:45

## 2021-07-19 RX ADMIN — Medication 1 DROP: at 08:52

## 2021-07-19 ASSESSMENT — MIFFLIN-ST. JEOR: SCORE: 1207.15

## 2021-07-19 NOTE — ANESTHESIA POSTPROCEDURE EVALUATION
Patient: Mona Richey    Procedure(s):  LEFT EYE PHACOEMULSIFICATION, CATARACT, CLEAR CORNEAL INCISION APPROACH, WITH STANDARD INTRAOCULAR LENS IMPLANT INSERTION AND ENDOSCOPIC CYCLOPHOTOCOAGULATION    Diagnosis:Combined form of age-related cataract, left eye [H25.812]  Primary open angle glaucoma (POAG) of left eye, moderate stage [H40.1122]  Diagnosis Additional Information: No value filed.    Anesthesia Type:  MAC    Note:  Disposition: Outpatient   Postop Pain Control: Uneventful            Sign Out: Well controlled pain   PONV: No   Neuro/Psych: Uneventful            Sign Out: Acceptable/Baseline neuro status   Airway/Respiratory: Uneventful            Sign Out: Acceptable/Baseline resp. status   CV/Hemodynamics: Uneventful            Sign Out: Acceptable CV status; No obvious hypovolemia; No obvious fluid overload   Other NRE: NONE   DID A NON-ROUTINE EVENT OCCUR? No           Last vitals:  Vitals:    07/19/21 1025 07/19/21 1030 07/19/21 1045   BP:  128/68 120/57   Pulse:      Resp: 18 18 18   Temp: 36.2  C (97.2  F)     SpO2: 97% 97% 97%       Last vitals prior to Anesthesia Care Transfer:  CRNA VITALS  7/19/2021 0949 - 7/19/2021 1049      7/19/2021             Pulse:  66    Ht Rate:  66    SpO2:  100 %          Electronically Signed By: Cruz Rosen MD, MD  July 19, 2021  10:55 AM

## 2021-07-19 NOTE — ANESTHESIA PREPROCEDURE EVALUATION
"Anesthesia Pre-Procedure Evaluation    Patient: Mona Richey   MRN: [id number] : 1950        Preoperative Diagnosis: Combined form of age-related cataract, left eye [H25.812]  Primary open angle glaucoma (POAG) of left eye, moderate stage [H40.1122]   Procedure : Procedure(s):  LEFT EYE PHACOEMULSIFICATION, CATARACT, CLEAR CORNEAL INCISION APPROACH, WITH STANDARD INTRAOCULAR LENS IMPLANT INSERTION AND ENDOSCOPIC CYCLOPHOTOCOAGULATION     Past Medical History:   Diagnosis Date     Arthritis      Hypertension      Irregular heart beat      Nonsenile cataract      Primary open angle glaucoma of both eyes, mild stage 2016      Past Surgical History:   Procedure Laterality Date     HYSTERECTOMY       LASIK  aprox  or earlier    LT eye only with BRAYDEN Best     TUBAL LIGATION        Allergies   Allergen Reactions     Lisinopril Angioedema and Anaphylaxis     \"Ace inhibitor reaction\"   Swelling of face and lips   Angioedema  Lips swollen, swollen tongue and face     Erythromycin GI Disturbance     nausea     Clarithromycin      nausea     Diatrizoate      Dust Mite Extract      Ivp Dye [Contrast Dye] Other (See Comments)     Sneezing that wouldn't stop     Tri-Levlen,       Social History     Tobacco Use     Smoking status: Never Smoker     Smokeless tobacco: Never Used   Substance Use Topics     Alcohol use: Yes     Alcohol/week: 0.0 standard drinks     Comment: \"Occasional glass of wine\"      Wt Readings from Last 1 Encounters:   21 77.6 kg (171 lb)        Anesthesia Evaluation   Pt has had prior anesthetic. Type: General.    History of anesthetic complications  - PONV.      ROS/MED HX  ENT/Pulmonary:  - neg pulmonary ROS     Neurologic:  - neg neurologic ROS     Cardiovascular:     (+) hypertension-----Irregular Heartbeat/Palpitations,     METS/Exercise Tolerance:     Hematologic:       Musculoskeletal:  - neg musculoskeletal ROS     GI/Hepatic:     (+) GERD, Asymptomatic on medication,   "   Renal/Genitourinary:  - neg Renal ROS     Endo:  - neg endo ROS     Psychiatric/Substance Use:  - neg psychiatric ROS     Infectious Disease:  - neg infectious disease ROS     Malignancy:  - neg malignancy ROS     Other:  - neg other ROS          Physical Exam    Airway  airway exam normal           Respiratory Devices and Support         Dental  no notable dental history         Cardiovascular   cardiovascular exam normal          Pulmonary   pulmonary exam normal                OUTSIDE LABS:  CBC: No results found for: WBC, HGB, HCT, PLT  BMP:   Lab Results   Component Value Date    GLC 91 12/08/2008     COAGS: No results found for: PTT, INR, FIBR  POC: No results found for: BGM, HCG, HCGS  HEPATIC: No results found for: ALBUMIN, PROTTOTAL, ALT, AST, GGT, ALKPHOS, BILITOTAL, BILIDIRECT, KIMBER  OTHER:   Lab Results   Component Value Date    CRP 1.5 12/08/2008       Anesthesia Plan    ASA Status:  2   NPO Status:  NPO Appropriate    Anesthesia Type: MAC.     - Reason for MAC: straight local not clinically adequate   Induction: Intravenous.   Maintenance: TIVA.        Consents    Anesthesia Plan(s) and associated risks, benefits, and realistic alternatives discussed. Questions answered and patient/representative(s) expressed understanding.     - Discussed with:  Patient         Postoperative Care    Pain management: Oral pain medications.   PONV prophylaxis: Ondansetron (or other 5HT-3), Dexamethasone or Solumedrol     Comments:                Cruz Rosen MD, MD

## 2021-07-19 NOTE — DISCHARGE INSTRUCTIONS
CATARACT SURGERY POST-OP INSTRUCTIONS  Dr. Lea Wheeler  [phone]        Use all three eye drops today, including   - Vigamox (tan top)   - Ketolorac (grey top)   - Prednisolone (white or pink top)    You should get 3 doses in today and 4 doses daily starting tomorrow.  Wait a few minutes in between putting each drop in.    *   Use your glaucoma drops the same as you have been    *  Take Diamox 500mg capsule: 1 capsule twice a day (about 12 hours apart) - prescription should be [facility] you at the [facility]    *  Keep the eye shield taped in place unless putting drops in. We will remove it for you in the office tomorrow.      Light sensitivity may be noticed. Sunglasses may be worn for comfort.      Do not rub the operated eye.      Keep the operated eye dry. You may wash your hair, bathe or shower, but keep the operated eye closed while doing so.       No swimming, hot tub, or sauna for 2 weeks.      No make up around eye for 5 days.      No bending at the waist or lifting more than 10 pounds for one week.      May take Tylenol (per directions on bottle) for mild pain.      Call the office at [phone] and ask to speak to the on-call ophthalmologist  if any of the following should occur:  o Any sudden vision changes  o Nausea or severe headache  o Increase in pain not controlled  o Or signs of infection (pus, increasing redness or tenderness)      [facility]  Home Care Following Anesthesia  For 24 hours after surgery:  1. Get plenty of rest.  A responsible adult must stay with you for at least 24 hours after you leave the surgery center.  2. Do not drive or use heavy equipment.  If you have weakness or tingling, don't drive or use heavy equipment until this feeling goes away.   3. Do not drink alcohol.   4. Avoid strenuous or risky activities.  Ask for help when climbing stairs.  5. You may feel lightheaded.  IF so, sit for a few minutes  before standing.  Have someone help you get up.   6. If you have nausea (feel sick to your stomach): Drink only clear liquids such as apple juice, ginger ale, broth or 7-Up.  Rest may also help.  Be sure to drink enough fluids.  Move to a regular diet as you feel able.   7. You may have a slight fever.  Call the doctor if your fever is over 100 F (37.7 C) (taken under the tongue) or lasts longer than 24 hours.  8. You may have a dry mouth, a sore throat, muscle aches or trouble sleeping. These should go away after 24 hours.  9. Do not make important or legal decisions.   10. It is recommended to avoid smoking.               Tips for taking pain medications  To get the best pain relief possible, remember these points:    Take pain medications as directed, before pain becomes severe.    Pain medication can upset your stomach: taking it with food may help.    Constipation is a common side effect of pain medication. Drink plenty of  fluids.    Eat foods high in fiber. Take a stool softener if recommended by your doctor or pharmacist.    Do not drink alcohol, drive or operate machinery while taking pain medications.    Ask about other ways to control pain, such as with heat, ice or relaxation.    Tylenol/Acetaminophen Consumption  To help encourage the safe use of acetaminophen, the makers of TYLENOL  have lowered the maximum daily dose for single-ingredient Extra Strength TYLENOL  (acetaminophen) products sold in the [place] from 8 pills per day (4,000 mg) to 6 pills per day (3,000 mg). The dosing interval has also changed from 2 pills every 4-6 hours to 2 pills every 6 hours.    If you feel your pain relief is insufficient, you may take Tylenol/Acetaminophen in addition to your narcotic pain medication.     Be careful not to exceed 3,000 mg of Tylenol/Acetaminophen in a 24 hour period from all sources.    If you are taking extra strength Tylenol/acetaminophen (500 mg), the maximum dose is 6 tablets in 24 hours.    If you  are taking regular strength acetaminophen (325 mg), the maximum dose is 9 tablets in 24 hours.    975 mg of tylenol given at 9:00 AM. Okay to take next dose at 3:00 PM.    Call a doctor for any of the followin. Signs of infection (fever, growing tenderness at the surgery site, a large amount of drainage or bleeding, severe pain, foul-smelling drainage, redness, swelling).  2. It has been over 8 to 10 hours since surgery and you are still not able to urinate (pass water).  3. Headache for over 24 hours.  4. Numbness, tingling or weakness the day after surgery (if you had spinal anesthesia).  5. Signs of Covid-19 infection (temperature over 100 degrees, shortness of breath, cough, loss of taste/smell, generalized body aches, persistent headache, chills, sore throat, nausea/vomiting/diarrhea)  Your doctor is:  Dr. Lea Wheeler, Ophthalmology: [phone]                Or dial [phone] and ask for the resident on call for:  Ophthalmology  For emergency care, call the:  [facility]:  [phone] (TTY for hearing impaired: [phone])

## 2021-07-20 ENCOUNTER — OFFICE VISIT (OUTPATIENT)
Dept: OPHTHALMOLOGY | Facility: CLINIC | Age: 71
End: 2021-07-20
Payer: MEDICARE

## 2021-07-20 DIAGNOSIS — Z96.1 PSEUDOPHAKIA: Primary | ICD-10-CM

## 2021-07-20 DIAGNOSIS — Z98.890 S/P LASIK SURGERY: ICD-10-CM

## 2021-07-20 DIAGNOSIS — H40.1122 PRIMARY OPEN ANGLE GLAUCOMA (POAG) OF LEFT EYE, MODERATE STAGE: ICD-10-CM

## 2021-07-20 PROCEDURE — 99024 POSTOP FOLLOW-UP VISIT: CPT | Performed by: STUDENT IN AN ORGANIZED HEALTH CARE EDUCATION/TRAINING PROGRAM

## 2021-07-20 ASSESSMENT — SLIT LAMP EXAM - LIDS
COMMENTS: 1+ DERMATOCHALASIS
COMMENTS: 1+ DERMATOCHALASIS

## 2021-07-20 ASSESSMENT — EXTERNAL EXAM - LEFT EYE: OS_EXAM: NORMAL

## 2021-07-20 ASSESSMENT — TONOMETRY
OD_IOP_MMHG: 11
IOP_METHOD: APPLANATION
OS_IOP_MMHG: 11

## 2021-07-20 ASSESSMENT — VISUAL ACUITY
OS_SC+: -2
OS_SC: 20/30
METHOD: SNELLEN - LINEAR
OD_SC+: -1
OD_SC: 20/40

## 2021-07-20 ASSESSMENT — EXTERNAL EXAM - RIGHT EYE: OD_EXAM: NORMAL

## 2021-07-20 NOTE — LETTER
7/20/2021         RE: Mona Richey  [address] [address]        Dear Colleague,    Thank you for referring your patient, Mona Richey, to the [facility]. Please see a copy of my visit note below.     Current Eye Medications:  Prednisolone, ketorolac, and moxifloxacin four times a day left eye. Brimonidine twice a day both eyes, last took at 6 am. Dorzolamide/timolol twice a day last took at 6:03 am and latanoprost at bedtime both eyes, last took at 9:30 pm. Diamox 500mg, took last of 4 tablets this morning (felt fingers tingling this am for first time).     Subjective:  Po1, KPE/IOL left eye 7/19/21. Patient slept well last night. Vision is much improved left eye, colors much better. No eye pain or discomfort in either eye.      Objective:  See Ophthalmology Exam.       Assessment:  Mona Richey is a 71 year old female who presents with:   Encounter Diagnoses   Name Primary?     Pseudophakia - Left Eye POD1 s/p CE/IOL and ECP left eye. Intraocular pressure 11/11 today (on Diamox).       S/P LASIK surgery OS (2001)      Primary open angle glaucoma (POAG) of left eye, moderate stage s/p ECP OS        Plan:  POST-OP CATARACT INSTRUCTIONS    *   Use the following drop(s) in the LEFT EYE four times a day:        Moxifloxacin (tan top), ketorolac (grey top), and prednisolone (white or pink top)    *   Continue Brimonidine (purple top) twice a day and Cosopt (dorzolamide-timolol -- dark blue top) twice a day in both eyes     *   Wear eye shield when sleeping for one week. Do not rub the operated eye.     *   No bending or lifting more than 10 pounds for one week.    *   Keep water out of eye for two weeks.    *   OK to resume aspirin and/or other blood thinners if you stopped.     *   Return as scheduled in about one week.    *   If your vision worsens, eye becomes increasingly red, or becomes painful, call [phone].     Lea Wheeler M.D.            Again,  thank you for allowing me to participate in the care of your patient.        Sincerely,        Lea Wheeler MD

## 2021-07-20 NOTE — PROGRESS NOTES
Current Eye Medications:  Prednisolone, ketorolac, and moxifloxacin four times a day left eye. Brimonidine twice a day both eyes, last took at 6 am. Dorzolamide/timolol twice a day last took at 6:03 am and latanoprost at bedtime both eyes, last took at 9:30 pm. Diamox 500mg, took last of 4 tablets this morning (felt fingers tingling this am for first time).     Subjective:  Po1, KPE/IOL left eye 7/19/21. Patient slept well last night. Vision is much improved left eye, colors much better. No eye pain or discomfort in either eye.      Objective:  See Ophthalmology Exam.       Assessment:  Mona Richey is a 71 year old female who presents with:   Encounter Diagnoses   Name Primary?     Pseudophakia - Left Eye POD1 s/p CE/IOL and ECP left eye. Intraocular pressure 11/11 today (on Diamox).       S/P LASIK surgery OS (2001)      Primary open angle glaucoma (POAG) of left eye, moderate stage s/p ECP OS        Plan:  POST-OP CATARACT INSTRUCTIONS    *   Use the following drop(s) in the LEFT EYE four times a day:        Moxifloxacin (tan top), ketorolac (grey top), and prednisolone (white or pink top)    *   Continue Brimonidine (purple top) twice a day and Cosopt (dorzolamide-timolol -- dark blue top) twice a day in both eyes     *   Wear eye shield when sleeping for one week. Do not rub the operated eye.     *   No bending or lifting more than 10 pounds for one week.    *   Keep water out of eye for two weeks.    *   OK to resume aspirin and/or other blood thinners if you stopped.     *   Return as scheduled in about one week.    *   If your vision worsens, eye becomes increasingly red, or becomes painful, call [phone].     Lea Wheeler M.D.

## 2021-07-20 NOTE — PATIENT INSTRUCTIONS
POST-OP CATARACT INSTRUCTIONS    *   Use the following drop(s) in the LEFT EYE four times a day:        Moxifloxacin (tan top), ketorolac (grey top), and prednisolone (white or pink top)    *   Continue Brimonidine (purple top) twice a day and Cosopt (dorzolamide-timolol -- dark blue top) twice a day in both eyes     *   Wear eye shield when sleeping for one week. Do not rub the operated eye.     *   No bending or lifting more than 10 pounds for one week.    *   Keep water out of eye for two weeks.    *   OK to resume aspirin and/or other blood thinners if you stopped.     *   Return as scheduled in about one week.    *   If your vision worsens, eye becomes increasingly red, or becomes painful, call [phone].     Lea Wheeler M.D.

## 2021-07-23 ENCOUNTER — DOCUMENTATION ONLY (OUTPATIENT)
Dept: LAB | Facility: CLINIC | Age: 71
End: 2021-07-23

## 2021-07-23 DIAGNOSIS — Z11.59 ENCOUNTER FOR SCREENING FOR OTHER VIRAL DISEASES: Primary | ICD-10-CM

## 2021-07-26 ENCOUNTER — OFFICE VISIT (OUTPATIENT)
Dept: OPHTHALMOLOGY | Facility: CLINIC | Age: 71
End: 2021-07-26
Payer: MEDICARE

## 2021-07-26 DIAGNOSIS — H25.811 COMBINED FORM OF AGE-RELATED CATARACT, RIGHT EYE: ICD-10-CM

## 2021-07-26 DIAGNOSIS — H40.1122 PRIMARY OPEN ANGLE GLAUCOMA (POAG) OF LEFT EYE, MODERATE STAGE: ICD-10-CM

## 2021-07-26 DIAGNOSIS — Z96.1 PSEUDOPHAKIA: Primary | ICD-10-CM

## 2021-07-26 DIAGNOSIS — Z98.890 S/P LASIK SURGERY: ICD-10-CM

## 2021-07-26 PROCEDURE — 92136 OPHTHALMIC BIOMETRY: CPT | Mod: 26 | Performed by: STUDENT IN AN ORGANIZED HEALTH CARE EDUCATION/TRAINING PROGRAM

## 2021-07-26 PROCEDURE — 99024 POSTOP FOLLOW-UP VISIT: CPT | Performed by: STUDENT IN AN ORGANIZED HEALTH CARE EDUCATION/TRAINING PROGRAM

## 2021-07-26 RX ORDER — PREDNISOLONE ACETATE 10 MG/ML
1 SUSPENSION/ DROPS OPHTHALMIC 4 TIMES DAILY
Qty: 5 ML | Refills: 0 | Status: SHIPPED | OUTPATIENT
Start: 2021-08-01 | End: 2021-10-27

## 2021-07-26 RX ORDER — MOXIFLOXACIN 5 MG/ML
1 SOLUTION/ DROPS OPHTHALMIC 4 TIMES DAILY
Qty: 3 ML | Refills: 0 | Status: SHIPPED | OUTPATIENT
Start: 2021-08-01 | End: 2021-10-27

## 2021-07-26 RX ORDER — KETOROLAC TROMETHAMINE 5 MG/ML
1 SOLUTION OPHTHALMIC 4 TIMES DAILY
Qty: 5 ML | Refills: 0 | Status: SHIPPED | OUTPATIENT
Start: 2021-08-01 | End: 2021-10-27

## 2021-07-26 RX ORDER — PREDNISOLONE ACETATE 10 MG/ML
1 SUSPENSION/ DROPS OPHTHALMIC 4 TIMES DAILY
Qty: 10 ML | Refills: 0 | Status: CANCELLED | OUTPATIENT
Start: 2021-08-01

## 2021-07-26 RX ORDER — MOXIFLOXACIN 5 MG/ML
1 SOLUTION/ DROPS OPHTHALMIC 4 TIMES DAILY
Qty: 3 ML | Refills: 0 | Status: CANCELLED | OUTPATIENT
Start: 2021-08-01

## 2021-07-26 ASSESSMENT — TONOMETRY
OS_IOP_MMHG: 14
IOP_METHOD: APPLANATION

## 2021-07-26 ASSESSMENT — VISUAL ACUITY
OD_SC: 20/40
METHOD: SNELLEN - LINEAR
OD_SC+: +2
OS_SC+: -3
OS_SC: 20/25

## 2021-07-26 ASSESSMENT — EXTERNAL EXAM - LEFT EYE: OS_EXAM: NORMAL

## 2021-07-26 ASSESSMENT — SLIT LAMP EXAM - LIDS
COMMENTS: 1+ DERMATOCHALASIS
COMMENTS: 1+ DERMATOCHALASIS

## 2021-07-26 ASSESSMENT — REFRACTION_MANIFEST
OS_SPHERE: PLANO
OS_CYLINDER: SPHERE

## 2021-07-26 ASSESSMENT — EXTERNAL EXAM - RIGHT EYE: OD_EXAM: NORMAL

## 2021-07-26 NOTE — PATIENT INSTRUCTIONS
POST-OP CATARACT INSTRUCTIONS for the LEFT EYE:    *   Use the following eye drop(s) four times a day until the bottle runs out:   continue Vigamox or ofloxacin (tan top), ketorolac (grey top) and prednisolone (white or pink top) four times a day until the bottles run out.    *   Continue Latanoprost (green top) at bedtime both eyes       Continue Cosopt (dorzolamide-timolol -- dark blue top) twice a day both eyes       Continue Brimonidine (purple top) twice a day both eyes      *  If you would like to use an artificial tear drop up to 4 times a day, that is fine.    *   Stop wearing eye shield.    *   No eye rubbing. May resume heavy lifting.    *   If your vision worsens, eye becomes increasingly red, or becomes painful, call [phone].      PRE-OP CATARACT INSTRUCTIONS for the RIGHT EYE:    ** 3 eye drops from the pharmacy at least 3 days before surgery.    *Use the following drops in the right eye 4 times on the day before surgery and once the morning of surgery:                                   Vigamox or Ofloxacin (tan cap)   Ketorolac (gray cap)   Prednisolone (white or pink top)     *Continue all your glaucoma drops the same before and after surgery    *Please bring all your eyedrops to the surgery center.    *If taking more than one drop, wait five minutes between drops.    *No solid food or drink after midnight. Please take the starred medications with a small sip of water the morning of surgery.    Lea Wheeler MD  [phone]

## 2021-07-26 NOTE — PROGRESS NOTES
" Current Eye Medications:  Vigamox, Ketorolac and Prednisolone 1% left eye four times a day.    Cosopt and brimonidine both eyes twice a day, Latanoprost both eyes every evening.     Subjective:  Status/Post Kelman Phacoemulsification with Posterior Chamber Lens, left eye:  7-19-21. Vision is improving.  She woke with some crusting in the medial canthus area, left eye, this morning.  Also her left lower eyelid feels like it's \"sticking\" and dry feeling.      Patient is also here for a pre op cataract surgery right eye, scheduled for 8-2-21.  History and Physical was done.       Objective:  See Ophthalmology Exam.       Assessment:  Mona Richey is a 71 year old female who presents with:   Encounter Diagnoses   Name Primary?     Pseudophakia - Left Eye POW2 s/p CE/IOL and ECP left eye - doing well. Intraocular pressure 14 left eye.        Combined form of age-related cataract, right eye Cataract with ECP pre-op right eye. 3 drops. Dil 8. Target mostly distance right eye. (Possible additional sedation prior to ECP since she had some discomfort with that portion of the procedure last time).       S/P LASIK surgery OS (2001)      Primary open angle glaucoma (POAG) of left eye, moderate stage s/p ECP OS        Plan:  POST-OP CATARACT INSTRUCTIONS for the LEFT EYE:    *   Use the following eye drop(s) four times a day until the bottle runs out:   continue Vigamox or ofloxacin (tan top), ketorolac (grey top) and prednisolone (white or pink top) four times a day until the bottles run out.    *   Continue Latanoprost (green top) at bedtime both eyes       Continue Cosopt (dorzolamide-timolol -- dark blue top) twice a day both eyes       Continue Brimonidine (purple top) twice a day both eyes      *  If you would like to use an artificial tear drop up to 4 times a day, that is fine.    *   Stop wearing eye shield.    *   No eye rubbing. May resume heavy lifting.    *   If your vision worsens, eye becomes increasingly red, " or becomes painful, call [phone].      PRE-OP CATARACT INSTRUCTIONS for the RIGHT EYE:    ** 3 eye drops from the pharmacy at least 3 days before surgery.    *Use the following drops in the right eye 4 times on the day before surgery and once the morning of surgery:                                   Vigamox or Ofloxacin (tan cap)   Ketorolac (gray cap)   Prednisolone (white or pink top)     *Continue all your glaucoma drops the same before and after surgery    *Please bring all your eyedrops to the surgery center.    *If taking more than one drop, wait five minutes between drops.    *No solid food or drink after midnight. Please take the starred medications with a small sip of water the morning of surgery.    Lea Wheeler MD  [phone]

## 2021-07-26 NOTE — LETTER
"    7/26/2021         RE: Mona Richey  [address] [facility] [address]        Dear Colleague,    Thank you for referring your patient, Mona Richey, to the [facility]. Please see a copy of my visit note below.     Current Eye Medications:  Vigamox, Ketorolac and Prednisolone 1% left eye four times a day.    Cosopt and brimonidine both eyes twice a day, Latanoprost both eyes every evening.     Subjective:  Status/Post Kelman Phacoemulsification with Posterior Chamber Lens, left eye:  7-19-21. Vision is improving.  She woke with some crusting in the medial canthus area, left eye, this morning.  Also her left lower eyelid feels like it's \"sticking\" and dry feeling.      Patient is also here for a pre op cataract surgery right eye, scheduled for 8-2-21.  History and Physical was done.       Objective:  See Ophthalmology Exam.       Assessment:  Mona Richey is a 71 year old female who presents with:   Encounter Diagnoses   Name Primary?     Pseudophakia - Left Eye POW2 s/p CE/IOL and ECP left eye - doing well. Intraocular pressure 14 left eye.        Combined form of age-related cataract, right eye Cataract with ECP pre-op right eye. 3 drops. Dil 8. Target mostly distance right eye. (Possible additional sedation prior to ECP since she had some discomfort with that portion of the procedure last time).       S/P LASIK surgery OS (2001)      Primary open angle glaucoma (POAG) of left eye, moderate stage s/p ECP OS        Plan:  POST-OP CATARACT INSTRUCTIONS for the LEFT EYE:    *   Use the following eye drop(s) four times a day until the bottle runs out:   continue Vigamox or ofloxacin (tan top), ketorolac (grey top) and prednisolone (white or pink top) four times a day until the bottles run out.    *   Continue Latanoprost (green top) at bedtime both eyes       Continue Cosopt (dorzolamide-timolol -- dark blue top) twice a day both eyes       Continue Brimonidine (purple top) " twice a day both eyes      *  If you would like to use an artificial tear drop up to 4 times a day, that is fine.    *   Stop wearing eye shield.    *   No eye rubbing. May resume heavy lifting.    *   If your vision worsens, eye becomes increasingly red, or becomes painful, call [phone].      PRE-OP CATARACT INSTRUCTIONS for the RIGHT EYE:    ** 3 eye drops from the pharmacy at least 3 days before surgery.    *Use the following drops in the right eye 4 times on the day before surgery and once the morning of surgery:                                   Vigamox or Ofloxacin (tan cap)   Ketorolac (gray cap)   Prednisolone (white or pink top)     *Continue all your glaucoma drops the same before and after surgery    *Please bring all your eyedrops to the surgery center.    *If taking more than one drop, wait five minutes between drops.    *No solid food or drink after midnight. Please take the starred medications with a small sip of water the morning of surgery.    Lea Wheeler MD  [phone]          Again, thank you for allowing me to participate in the care of your patient.        Sincerely,        Lea Wheeler MD

## 2021-07-30 ENCOUNTER — LAB (OUTPATIENT)
Dept: LAB | Facility: CLINIC | Age: 71
End: 2021-07-30
Payer: MEDICARE

## 2021-07-30 ENCOUNTER — ANESTHESIA EVENT (OUTPATIENT)
Dept: SURGERY | Facility: AMBULATORY SURGERY CENTER | Age: 71
End: 2021-07-30
Payer: MEDICARE

## 2021-07-30 DIAGNOSIS — Z11.59 ENCOUNTER FOR SCREENING FOR OTHER VIRAL DISEASES: ICD-10-CM

## 2021-07-30 PROCEDURE — U0005 INFEC AGEN DETEC AMPLI PROBE: HCPCS

## 2021-07-30 PROCEDURE — U0003 INFECTIOUS AGENT DETECTION BY NUCLEIC ACID (DNA OR RNA); SEVERE ACUTE RESPIRATORY SYNDROME CORONAVIRUS 2 (SARS-COV-2) (CORONAVIRUS DISEASE [COVID-19]), AMPLIFIED PROBE TECHNIQUE, MAKING USE OF HIGH THROUGHPUT TECHNOLOGIES AS DESCRIBED BY CMS-2020-01-R: HCPCS

## 2021-07-31 LAB — SARS-COV-2 RNA RESP QL NAA+PROBE: NEGATIVE

## 2021-08-01 NOTE — ANESTHESIA PREPROCEDURE EVALUATION
"Anesthesia Pre-Procedure Evaluation    Patient: Mona Richey   MRN: [id number] : 1950        Preoperative Diagnosis: Combined form of age-related cataract, right eye [H25.811]  Primary open angle glaucoma (POAG) of right eye, moderate stage [H40.1112]   Procedure : Procedure(s):  RIGHT EYE PHACOEMULSIFICATION, CATARACT, CLEAR CORNEAL INCISION APPROACH, WITH STANDARD INTRAOCULAR LENS IMPLANT INSERTION AND ENDOSCOPIC CYCLOPHOTOCOAGULATION     Past Medical History:   Diagnosis Date     Arthritis      Hypertension      Irregular heart beat      Nonsenile cataract      Primary open angle glaucoma of both eyes, mild stage 2016      Past Surgical History:   Procedure Laterality Date     HYSTERECTOMY       LASIK  aprox  or earlier    LT eye only with M. Nasir     PHACOEMULSIFICATION CLEAR CORNEA W/ STANDARD IOL IMPLANT, ENDOSCOPIC CYCLOPHOTOCOAGULATION, COMBINED Left 2021    Procedure: LEFT EYE PHACOEMULSIFICATION, CATARACT, CLEAR CORNEAL INCISION APPROACH, WITH STANDARD INTRAOCULAR LENS IMPLANT INSERTION AND ENDOSCOPIC CYCLOPHOTOCOAGULATION;  Surgeon: Lea Wheeler MD;  Location: [facility] OR     TUBAL LIGATION        Allergies   Allergen Reactions     Lisinopril Angioedema and Anaphylaxis     \"Ace inhibitor reaction\"   Swelling of face and lips   Angioedema  Lips swollen, swollen tongue and face     Erythromycin GI Disturbance     nausea     Clarithromycin      nausea     Diatrizoate      Dust Mite Extract      Ivp Dye [Contrast Dye] Other (See Comments)     Sneezing that wouldn't stop     Tri-Levlen,       Social History     Tobacco Use     Smoking status: Never Smoker     Smokeless tobacco: Never Used   Substance Use Topics     Alcohol use: Yes     Alcohol/week: 0.0 standard drinks     Comment: \"Occasional glass of wine\"      Wt Readings from Last 1 Encounters:   21 77.6 kg (171 lb)        Anesthesia Evaluation   Pt has had prior anesthetic. Type: General.    History of anesthetic " complications  - PONV.      ROS/MED HX  ENT/Pulmonary:  - neg pulmonary ROS     Neurologic:  - neg neurologic ROS     Cardiovascular:     (+) hypertension-----Irregular Heartbeat/Palpitations,     METS/Exercise Tolerance:     Hematologic:       Musculoskeletal:  - neg musculoskeletal ROS     GI/Hepatic:     (+) GERD, Asymptomatic on medication,     Renal/Genitourinary:  - neg Renal ROS     Endo:  - neg endo ROS     Psychiatric/Substance Use:  - neg psychiatric ROS     Infectious Disease:  - neg infectious disease ROS     Malignancy:  - neg malignancy ROS     Other:  - neg other ROS          Physical Exam    Airway        Mallampati: II   TM distance: > 3 FB   Neck ROM: full   Mouth opening: > 3 cm    Respiratory Devices and Support         Dental  no notable dental history         Cardiovascular   cardiovascular exam normal          Pulmonary   pulmonary exam normal                OUTSIDE LABS:  CBC: No results found for: WBC, HGB, HCT, PLT  BMP:   Lab Results   Component Value Date    GLC 91 12/08/2008     COAGS: No results found for: PTT, INR, FIBR  POC: No results found for: BGM, HCG, HCGS  HEPATIC: No results found for: ALBUMIN, PROTTOTAL, ALT, AST, GGT, ALKPHOS, BILITOTAL, BILIDIRECT, KIMBER  OTHER:   Lab Results   Component Value Date    CRP 1.5 12/08/2008       Anesthesia Plan    ASA Status:  2   NPO Status:  NPO Appropriate    Anesthesia Type: MAC.     - Reason for MAC: straight local not clinically adequate   Induction: Intravenous.   Maintenance: TIVA.        Consents    Anesthesia Plan(s) and associated risks, benefits, and realistic alternatives discussed. Questions answered and patient/representative(s) expressed understanding.     - Discussed with:  Patient      - Extended Intubation/Ventilatory Support Discussed: No.      - Patient is DNR/DNI Status: No    Use of blood products discussed: No .     Postoperative Care    Pain management: Oral pain medications.   PONV prophylaxis: Ondansetron (or other  5HT-3), Dexamethasone or Solumedrol     Comments:                Mark Michaels MD

## 2021-08-02 ENCOUNTER — ANESTHESIA (OUTPATIENT)
Dept: SURGERY | Facility: AMBULATORY SURGERY CENTER | Age: 71
End: 2021-08-02
Payer: MEDICARE

## 2021-08-02 ENCOUNTER — HOSPITAL ENCOUNTER (OUTPATIENT)
Facility: AMBULATORY SURGERY CENTER | Age: 71
End: 2021-08-02
Attending: STUDENT IN AN ORGANIZED HEALTH CARE EDUCATION/TRAINING PROGRAM
Payer: MEDICARE

## 2021-08-02 VITALS
TEMPERATURE: 97.9 F | DIASTOLIC BLOOD PRESSURE: 62 MMHG | HEART RATE: 63 BPM | WEIGHT: 171 LBS | BODY MASS INDEX: 33.57 KG/M2 | SYSTOLIC BLOOD PRESSURE: 125 MMHG | HEIGHT: 60 IN | RESPIRATION RATE: 16 BRPM | OXYGEN SATURATION: 99 %

## 2021-08-02 DIAGNOSIS — H40.1112 PRIMARY OPEN ANGLE GLAUCOMA (POAG) OF RIGHT EYE, MODERATE STAGE: ICD-10-CM

## 2021-08-02 DIAGNOSIS — H25.811 COMBINED FORM OF AGE-RELATED CATARACT, RIGHT EYE: ICD-10-CM

## 2021-08-02 PROCEDURE — 66988 XCAPSL CTRC RMVL W/ECP: CPT | Mod: RT

## 2021-08-02 PROCEDURE — 66982 XCAPSL CTRC RMVL CPLX WO ECP: CPT | Mod: RT | Performed by: STUDENT IN AN ORGANIZED HEALTH CARE EDUCATION/TRAINING PROGRAM

## 2021-08-02 DEVICE — EYE IMP IOL AMO PCL TECNIS ZCB00 20.0: Type: IMPLANTABLE DEVICE | Site: EYE | Status: FUNCTIONAL

## 2021-08-02 RX ORDER — SODIUM CHLORIDE, SODIUM LACTATE, POTASSIUM CHLORIDE, CALCIUM CHLORIDE 600; 310; 30; 20 MG/100ML; MG/100ML; MG/100ML; MG/100ML
INJECTION, SOLUTION INTRAVENOUS CONTINUOUS
Status: DISCONTINUED | OUTPATIENT
Start: 2021-08-02 | End: 2021-08-03 | Stop reason: HOSPADM

## 2021-08-02 RX ORDER — ONDANSETRON 2 MG/ML
4 INJECTION INTRAMUSCULAR; INTRAVENOUS EVERY 30 MIN PRN
Status: DISCONTINUED | OUTPATIENT
Start: 2021-08-02 | End: 2021-08-03 | Stop reason: HOSPADM

## 2021-08-02 RX ORDER — TETRACAINE HYDROCHLORIDE 5 MG/ML
SOLUTION OPHTHALMIC PRN
Status: DISCONTINUED | OUTPATIENT
Start: 2021-08-02 | End: 2021-08-02 | Stop reason: HOSPADM

## 2021-08-02 RX ORDER — FENTANYL CITRATE 50 UG/ML
50 INJECTION, SOLUTION INTRAMUSCULAR; INTRAVENOUS EVERY 5 MIN PRN
Status: DISCONTINUED | OUTPATIENT
Start: 2021-08-02 | End: 2021-08-03 | Stop reason: HOSPADM

## 2021-08-02 RX ORDER — MEPERIDINE HYDROCHLORIDE 25 MG/ML
12.5 INJECTION INTRAMUSCULAR; INTRAVENOUS; SUBCUTANEOUS
Status: DISCONTINUED | OUTPATIENT
Start: 2021-08-02 | End: 2021-08-03 | Stop reason: HOSPADM

## 2021-08-02 RX ORDER — MOXIFLOXACIN IN NACL,ISO-OS/PF 0.3MG/0.3
SYRINGE (ML) INTRAOCULAR PRN
Status: DISCONTINUED | OUTPATIENT
Start: 2021-08-02 | End: 2021-08-02 | Stop reason: HOSPADM

## 2021-08-02 RX ORDER — CYCLOPENTOLAT/TROPIC/PHENYLEPH 1%-1%-2.5%
1 DROPS (EA) OPHTHALMIC (EYE)
Status: COMPLETED | OUTPATIENT
Start: 2021-08-02 | End: 2021-08-02

## 2021-08-02 RX ORDER — ACETAZOLAMIDE 500 MG/1
500 CAPSULE, EXTENDED RELEASE ORAL 2 TIMES DAILY
Qty: 5 CAPSULE | Refills: 0 | Status: SHIPPED | OUTPATIENT
Start: 2021-08-02 | End: 2021-10-27

## 2021-08-02 RX ORDER — FENTANYL CITRATE 50 UG/ML
INJECTION, SOLUTION INTRAMUSCULAR; INTRAVENOUS PRN
Status: DISCONTINUED | OUTPATIENT
Start: 2021-08-02 | End: 2021-08-02

## 2021-08-02 RX ORDER — BALANCED SALT SOLUTION 6.4; .75; .48; .3; 3.9; 1.7 MG/ML; MG/ML; MG/ML; MG/ML; MG/ML; MG/ML
SOLUTION OPHTHALMIC PRN
Status: DISCONTINUED | OUTPATIENT
Start: 2021-08-02 | End: 2021-08-02 | Stop reason: HOSPADM

## 2021-08-02 RX ORDER — ONDANSETRON 4 MG/1
4 TABLET, ORALLY DISINTEGRATING ORAL EVERY 30 MIN PRN
Status: DISCONTINUED | OUTPATIENT
Start: 2021-08-02 | End: 2021-08-03 | Stop reason: HOSPADM

## 2021-08-02 RX ORDER — OXYCODONE HYDROCHLORIDE 5 MG/1
5 TABLET ORAL EVERY 4 HOURS PRN
Status: DISCONTINUED | OUTPATIENT
Start: 2021-08-02 | End: 2021-08-03 | Stop reason: HOSPADM

## 2021-08-02 RX ORDER — PROPARACAINE HYDROCHLORIDE 5 MG/ML
1 SOLUTION/ DROPS OPHTHALMIC ONCE
Status: COMPLETED | OUTPATIENT
Start: 2021-08-02 | End: 2021-08-02

## 2021-08-02 RX ORDER — LIDOCAINE 40 MG/G
CREAM TOPICAL
Status: DISCONTINUED | OUTPATIENT
Start: 2021-08-02 | End: 2021-08-03 | Stop reason: HOSPADM

## 2021-08-02 RX ORDER — SODIUM CHLORIDE, SODIUM LACTATE, POTASSIUM CHLORIDE, CALCIUM CHLORIDE 600; 310; 30; 20 MG/100ML; MG/100ML; MG/100ML; MG/100ML
INJECTION, SOLUTION INTRAVENOUS CONTINUOUS PRN
Status: DISCONTINUED | OUTPATIENT
Start: 2021-08-02 | End: 2021-08-02

## 2021-08-02 RX ADMIN — Medication 1 DROP: at 08:50

## 2021-08-02 RX ADMIN — Medication 1 DROP: at 08:45

## 2021-08-02 RX ADMIN — FENTANYL CITRATE 25 MCG: 50 INJECTION, SOLUTION INTRAMUSCULAR; INTRAVENOUS at 09:49

## 2021-08-02 RX ADMIN — Medication 1 DROP: at 08:39

## 2021-08-02 RX ADMIN — FENTANYL CITRATE 25 MCG: 50 INJECTION, SOLUTION INTRAMUSCULAR; INTRAVENOUS at 09:55

## 2021-08-02 RX ADMIN — SODIUM CHLORIDE, SODIUM LACTATE, POTASSIUM CHLORIDE, CALCIUM CHLORIDE: 600; 310; 30; 20 INJECTION, SOLUTION INTRAVENOUS at 09:22

## 2021-08-02 RX ADMIN — PROPARACAINE HYDROCHLORIDE 1 DROP: 5 SOLUTION/ DROPS OPHTHALMIC at 08:39

## 2021-08-02 RX ADMIN — SODIUM CHLORIDE, SODIUM LACTATE, POTASSIUM CHLORIDE, CALCIUM CHLORIDE: 600; 310; 30; 20 INJECTION, SOLUTION INTRAVENOUS at 08:42

## 2021-08-02 ASSESSMENT — MIFFLIN-ST. JEOR: SCORE: 1207.15

## 2021-08-02 NOTE — DISCHARGE INSTRUCTIONS
CATARACT SURGERY POST-OP INSTRUCTIONS  Dr. Lea Wheeler  [phone]        Use all three eye drops today, including   - Vigamox (tan top)   - Ketolorac (grey top)   - Prednisolone (white or pink top)    You should get 3 doses in today and 4 doses daily starting tomorrow.  Wait a few minutes in between putting each drop in.    *  Take Diamox pills: 1 pill every 12 hours (take first one when you get home)      Keep the eye shield taped in place unless putting drops in. We will remove it for you in the office tomorrow.      Light sensitivity may be noticed. Sunglasses may be worn for comfort.      Do not rub the operated eye.      Keep the operated eye dry. You may wash your hair, bathe or shower, but keep the operated eye closed while doing so.       No swimming, hot tub, or sauna for 2 weeks.      No make up around eye for 5 days.      No bending at the waist or lifting more than 10 pounds for one week.      May take Tylenol (per directions on bottle) for mild pain.      Call the office at [phone] and ask to speak to the on-call ophthalmologist  if any of the following should occur:  o Any sudden vision changes  o Nausea or severe headache  o Increase in pain not controlled  o Or signs of infection (pus, increasing redness or tenderness)    [facility]  Home Care Following Anesthesia  For 24 hours after surgery:  1. Get plenty of rest.  A responsible adult must stay with you for at least 24 hours after you leave the surgery center.  2. Do not drive or use heavy equipment.  If you have weakness or tingling, don't drive or use heavy equipment until this feeling goes away.   3. Do not drink alcohol.   4. Avoid strenuous or risky activities.  Ask for help when climbing stairs.  5. You may feel lightheaded.  IF so, sit for a few minutes before standing.  Have someone help you get up.   6. If you have nausea (feel sick to your stomach): Drink only clear liquids such as apple  "juice, ginger ale, broth or 7-Up.  Rest may also help.  Be sure to drink enough fluids.  Move to a regular diet as you feel able.   7. You may have a slight fever.  Call the doctor if your fever is over 100 F (37.7 C) (taken under the tongue) or lasts longer than 24 hours.  8. You may have a dry mouth, a sore throat, muscle aches or trouble sleeping. These should go away after 24 hours.  9. Do not make important or legal decisions.   10. It is recommended to avoid smoking.        Today you received a Marcaine or bupivacaine block to numb the nerves near your surgery site.  This is a block using local anesthetic or \"numbing\" medication injected around the nerves to anesthetize or \"numb\" the area supplied by those nerves.  This block is injected into the muscle layer near your surgical site.  The medication may numb the location where you had surgery for 6-18 hours, but may last up to 24 hours.  If your surgical site is an arm or leg you should be careful with your affected limb, since it is possible to injure your limb without being aware of it due to the numbing.  Until full feeling returns, you should guard against bumping or hitting your limb, and avoid extreme hot or cold temperatures on the skin.  As the block wears off, the feeling will return as a tingling or prickly sensation near your surgical site.  You will experience more discomfort from your incision as the feeling returns.  You may want to take a pain pill (a narcotic or Tylenol if this was prescribed by your surgeon) when you start to experience mild pain before the pain beccomes more severe.  If your pain medications do not control your pain you should notifiy your surgeon.    Tips for taking pain medications  To get the best pain relief possible, remember these points:    Take pain medications as directed, before pain becomes severe.    Pain medication can upset your stomach: taking it with food may help.    Constipation is a common side effect of " pain medication. Drink plenty of  fluids.    Eat foods high in fiber. Take a stool softener if recommended by your doctor or pharmacist.    Do not drink alcohol, drive or operate machinery while taking pain medications.    Ask about other ways to control pain, such as with heat, ice or relaxation.    Tylenol/Acetaminophen Consumption  To help encourage the safe use of acetaminophen, the makers of TYLENOL  have lowered the maximum daily dose for single-ingredient Extra Strength TYLENOL  (acetaminophen) products sold in the [place] from 8 pills per day (4,000 mg) to 6 pills per day (3,000 mg). The dosing interval has also changed from 2 pills every 4-6 hours to 2 pills every 6 hours.    If you feel your pain relief is insufficient, you may take Tylenol/Acetaminophen in addition to your narcotic pain medication.     Be careful not to exceed 3,000 mg of Tylenol/Acetaminophen in a 24 hour period from all sources.    If you are taking extra strength Tylenol/acetaminophen (500 mg), the maximum dose is 6 tablets in 24 hours.    If you are taking regular strength acetaminophen (325 mg), the maximum dose is 9 tablets in 24 hours.    Call a doctor for any of the followin. Signs of infection (fever, growing tenderness at the surgery site, a large amount of drainage or bleeding, severe pain, foul-smelling drainage, redness, swelling).  2. It has been over 8 to 10 hours since surgery and you are still not able to urinate (pass water).  3. Headache for over 24 hours.  4. Signs of Covid-19 infection (temperature over 100 degrees, shortness of breath, cough, loss of taste/smell, generalized body aches, persistent headache, chills, sore throat, nausea/vomiting/diarrhea)  Your doctor is:  Dr. Lea Wheeler, Ophthalmology: [phone]                  Or dial [phone] and ask for the resident on call for:  Ophthalmology  For emergency care, call the:  [facility]:  [phone] (TTY for hearing impaired:  [phone])

## 2021-08-02 NOTE — OP NOTE
PreOp Diagnosis: Visually significant nuclear sclerotic cataract right eye, primary open angle glaucoma right eye  PostOp Diagnosis: Same  Surgeon: Lea Wheeler MD  Implant: Tecnis [id number] +20.0D    Procedures:   1. Review of intraocular lens calculations, both eyes   2. Phacoemulsification and extraction of lens  right eye   3. Intraocular lens implantation right eye   4. Endoscopic cyclophotocoagulation, right eye  Anesthesia: MAC/topical  Complications: None  EBL: <1cc    Mona Richey suffers from a visually significant cataract of the right eye and primary open angle glaucoma right eye. This has caused problems with distance and reading vision, including glare. After discussing the risks, benefits, and alternatives, the patient wishes to proceed with cataract surgery with ECP.     The patient was identified in the pre-op area where the right eye was marked. The patient was then brought to the operating room where a time out was called, identifying the patient, the procedure, and the correct site. Tetracaine drops were applied to the operative eye. The operative eye was then prepped and draped in the usual sterile ophthalmic fashion. An eyelid speculum was placed into the operative eye. A paracentesis was made superior with a side port blade. 1% preservative-free lidocaine and epinephrine was injected into the anterior chamber.  Viscoat was injected to deepen the anterior chamber. A 2.4mm clear corneal wound was created with a keratome blade temporally.  A continuous curvilinear capsulorrhexis was started with a bent cystitome and completed with Utrada forceps. Hydrodissection of the lens nucleus was performed with BSS on a cannula. The lens nucleus was rotated. Phacoemulsification of the lens nucleus was accomplished in a phacoemulsification stop and chop technique. Remaining cortex was removed with irrigation and aspiration. The lens capsule was noted to be intact. Provisc was used to inflate the capsular  bag.  The lens was injected into the capsular bag. Healon was injected into the ciliary sulcus to create iris bombe from 10 to 7 o'clock. The endoscopic photocoagulation probe was inserted into the anterior chamber, directed toward the sulcus, and used to treat the ciliary processes from 10 to 7 o'clock. Power was set to 250mW and 77 spots were applied to the ciliary processes. Remaining viscoelastic was removed with irrigation and aspiration. The wounds were checked and found to be watertight after hydration. Intracameral moxifloxacin was injected into the anterior chamber. The eyelid speculum was removed. The patient tolerated the procedure well and was in stable condition on the way to the recovery area.     Lea Wheeler MD

## 2021-08-02 NOTE — ANESTHESIA POSTPROCEDURE EVALUATION
Patient: Mona Richey    Procedure(s):  RIGHT EYE PHACOEMULSIFICATION, CATARACT, CLEAR CORNEAL INCISION APPROACH, WITH STANDARD INTRAOCULAR LENS IMPLANT INSERTION AND ENDOSCOPIC CYCLOPHOTOCOAGULATION    Diagnosis:Combined form of age-related cataract, right eye [H25.811]  Primary open angle glaucoma (POAG) of right eye, moderate stage [H40.1112]  Diagnosis Additional Information: No value filed.    Anesthesia Type:  MAC    Note:  Disposition: Outpatient   Postop Pain Control: Uneventful            Sign Out: Well controlled pain   PONV:    Neuro/Psych: Uneventful            Sign Out: Acceptable/Baseline neuro status   Airway/Respiratory: Uneventful            Sign Out: Acceptable/Baseline resp. status   CV/Hemodynamics: Uneventful            Sign Out: Acceptable CV status; No obvious hypovolemia; No obvious fluid overload   Other NRE:    DID A NON-ROUTINE EVENT OCCUR?            Last vitals:  Vitals Value Taken Time   /62 08/02/21 1020   Temp 36.6  C (97.9  F) 08/02/21 1020   Pulse 63 08/02/21 1020   Resp 16 08/02/21 1020   SpO2 99 % 08/02/21 1020       Electronically Signed By: Mark Michaels MD  August 2, 2021  2:43 PM

## 2021-08-02 NOTE — ANESTHESIA CARE TRANSFER NOTE
Patient: Mona Richey    Procedure(s):  RIGHT EYE PHACOEMULSIFICATION, CATARACT, CLEAR CORNEAL INCISION APPROACH, WITH STANDARD INTRAOCULAR LENS IMPLANT INSERTION AND ENDOSCOPIC CYCLOPHOTOCOAGULATION    Diagnosis: Combined form of age-related cataract, right eye [H25.811]  Primary open angle glaucoma (POAG) of right eye, moderate stage [H40.1112]  Diagnosis Additional Information: No value filed.    Anesthesia Type:   MAC     Note:      Level of Consciousness: awake  Oxygen Supplementation: room air    Independent Airway: airway patency satisfactory and stable        Patient transferred to: Phase II    Handoff Report: Identifed the Patient, Identified the Reponsible Provider, Reviewed the pertinent medical history, Discussed the surgical course, Reviewed Intra-OP anesthesia mangement and issues during anesthesia, Set expectations for post-procedure period and Allowed opportunity for questions and acknowledgement of understanding      Vitals:  Vitals Value Taken Time   BP     Temp     Pulse     Resp     SpO2         Electronically Signed By: MIGUEL Redman CRNA  August 2, 2021  10:10 AM

## 2021-08-03 ENCOUNTER — OFFICE VISIT (OUTPATIENT)
Dept: OPHTHALMOLOGY | Facility: CLINIC | Age: 71
End: 2021-08-03
Payer: MEDICARE

## 2021-08-03 DIAGNOSIS — H40.1122 PRIMARY OPEN ANGLE GLAUCOMA (POAG) OF LEFT EYE, MODERATE STAGE: ICD-10-CM

## 2021-08-03 DIAGNOSIS — Z96.1 PSEUDOPHAKIA: Primary | ICD-10-CM

## 2021-08-03 DIAGNOSIS — Z98.890 S/P LASIK SURGERY: ICD-10-CM

## 2021-08-03 PROCEDURE — 99024 POSTOP FOLLOW-UP VISIT: CPT | Performed by: STUDENT IN AN ORGANIZED HEALTH CARE EDUCATION/TRAINING PROGRAM

## 2021-08-03 ASSESSMENT — TONOMETRY
IOP_METHOD: APPLANATION
OD_IOP_MMHG: 15

## 2021-08-03 ASSESSMENT — SLIT LAMP EXAM - LIDS
COMMENTS: 1+ DERMATOCHALASIS
COMMENTS: 1+ DERMATOCHALASIS

## 2021-08-03 ASSESSMENT — VISUAL ACUITY
OD_PH_SC: 20/25
OD_SC: 20/40
OD_SC+: +1
METHOD: SNELLEN - LINEAR

## 2021-08-03 ASSESSMENT — EXTERNAL EXAM - RIGHT EYE: OD_EXAM: NORMAL

## 2021-08-03 ASSESSMENT — EXTERNAL EXAM - LEFT EYE: OS_EXAM: NORMAL

## 2021-08-03 NOTE — PATIENT INSTRUCTIONS
POST-OP CATARACT INSTRUCTIONS    *   Use the following drop(s) in the RIGHT EYE four times a day:        Vigamox (tan top), ketorolac (grey top), and prednisolone (white or pink top)     *   Continue ketorolac (grey top), and prednisolone (white or pink top) four times a day in the left eye    *   Continue Cosopt (dorzolamide-timolol -- dark blue top) twice a day and Latanoprost (green top) at bedtime in both eyes and Brimonidine (purple top) twice a day both eyes     *  Continue Diamox pills: 1 pill every 12 hours until they run out    *   Wear eye shield when sleeping for one week. Do not rub the operated eye.     *   No bending or lifting more than 10 pounds for one week.    *   Keep water out of eye for two weeks.    *   OK to resume aspirin and/or other blood thinners if you stopped.     *   Return as scheduled in about one week.    *   If your vision worsens, eye becomes increasingly red, or becomes painful, call [phone].     Lea Wheeler M.D.

## 2021-08-03 NOTE — LETTER
8/3/2021         RE: Mona Richey  [address] [facility] [address]        Dear Colleague,    Thank you for referring your patient, Mona Richey, to the [facility]. Please see a copy of my visit note below.     Current Eye Medications:  Vigamox, Ketorolac, and Prednisolone 1% both eyes four times a day.   Cosopt and Brimonidine both eyes twice a day, Latanoprost both eyes every evening.  Diamox.  Refresh as needed.      Subjective:  Status/Post Kelman Phacoemulsification with Posterior Chamber Lens right eye:  8-2-21.  She accidentally hit her right eye with one of the eyedrop bottles last evening.  She has some discomfort with eye movement, and head position changes.  Vision is clear today in her right eye.      Objective:  See Ophthalmology Exam.       Assessment:  Mona Richey is a 71 year old female who presents with:   Encounter Diagnoses   Name Primary?     Pseudophakia - Both Eyes POD1 s/p CE/IOL and ECP right eye and Postoperative week 2 s/p CE/IOL and ECP left eye - both doing well. Intraocular pressure 14 right eye today.       S/P LASIK surgery OS (2001)      Primary open angle glaucoma (POAG) of left eye, moderate stage s/p ECP OU        Plan:  POST-OP CATARACT INSTRUCTIONS    *   Use the following drop(s) in the RIGHT EYE four times a day:        Vigamox (tan top), ketorolac (grey top), and prednisolone (white or pink top)     *   Continue ketorolac (grey top), and prednisolone (white or pink top) four times a day in the left eye    *   Continue Cosopt (dorzolamide-timolol -- dark blue top) twice a day and Latanoprost (green top) at bedtime in both eyes and Brimonidine (purple top) twice a day both eyes     *  Continue Diamox pills: 1 pill every 12 hours until they run out    *   Wear eye shield when sleeping for one week. Do not rub the operated eye.     *   No bending or lifting more than 10 pounds for one week.    *   Keep water out of eye for two  weeks.    *   OK to resume aspirin and/or other blood thinners if you stopped.     *   Return as scheduled in about one week.    *   If your vision worsens, eye becomes increasingly red, or becomes painful, call [phone].     Lea Wheeler M.D.            Again, thank you for allowing me to participate in the care of your patient.        Sincerely,        Lea Wheeler MD

## 2021-08-03 NOTE — PROGRESS NOTES
Current Eye Medications:  Vigamox, Ketorolac, and Prednisolone 1% both eyes four times a day.   Cosopt and Brimonidine both eyes twice a day, Latanoprost both eyes every evening.  Diamox.  Refresh as needed.      Subjective:  Status/Post Kelman Phacoemulsification with Posterior Chamber Lens right eye:  8-2-21.  She accidentally hit her right eye with one of the eyedrop bottles last evening.  She has some discomfort with eye movement, and head position changes.  Vision is clear today in her right eye.      Objective:  See Ophthalmology Exam.       Assessment:  Mona Richey is a 71 year old female who presents with:   Encounter Diagnoses   Name Primary?     Pseudophakia - Both Eyes POD1 s/p CE/IOL and ECP right eye and Postoperative week 2 s/p CE/IOL and ECP left eye - both doing well. Intraocular pressure 14 right eye today.       S/P LASIK surgery OS (2001)      Primary open angle glaucoma (POAG) of left eye, moderate stage s/p ECP OU        Plan:  POST-OP CATARACT INSTRUCTIONS    *   Use the following drop(s) in the RIGHT EYE four times a day:        Vigamox (tan top), ketorolac (grey top), and prednisolone (white or pink top)     *   Continue ketorolac (grey top), and prednisolone (white or pink top) four times a day in the left eye    *   Continue Cosopt (dorzolamide-timolol -- dark blue top) twice a day and Latanoprost (green top) at bedtime in both eyes and Brimonidine (purple top) twice a day both eyes     *  Continue Diamox pills: 1 pill every 12 hours until they run out    *   Wear eye shield when sleeping for one week. Do not rub the operated eye.     *   No bending or lifting more than 10 pounds for one week.    *   Keep water out of eye for two weeks.    *   OK to resume aspirin and/or other blood thinners if you stopped.     *   Return as scheduled in about one week.    *   If your vision worsens, eye becomes increasingly red, or becomes painful, call [phone].     Lea Wheeler M.D.

## 2021-08-04 DIAGNOSIS — H40.1131 PRIMARY OPEN ANGLE GLAUCOMA OF BOTH EYES, MILD STAGE: ICD-10-CM

## 2021-08-05 RX ORDER — DORZOLAMIDE HYDROCHLORIDE AND TIMOLOL MALEATE 20; 5 MG/ML; MG/ML
1 SOLUTION/ DROPS OPHTHALMIC 2 TIMES DAILY
Qty: 20 ML | Refills: 0 | Status: SHIPPED | OUTPATIENT
Start: 2021-08-05 | End: 2021-09-09

## 2021-08-05 NOTE — TELEPHONE ENCOUNTER
Routing refill request to provider for review/approval because:  Drug not on the [facility] refill protocol           Pending Prescriptions:                       Disp   Refills    dorzolamide-timolol (COSOPT) 2-0.5 % ophth*20 mL  0        Sig: Place 1 drop into both eyes 2 times daily        Genaro Cruz RN

## 2021-08-09 ENCOUNTER — OFFICE VISIT (OUTPATIENT)
Dept: OPHTHALMOLOGY | Facility: CLINIC | Age: 71
End: 2021-08-09
Payer: MEDICARE

## 2021-08-09 DIAGNOSIS — H40.1122 PRIMARY OPEN ANGLE GLAUCOMA (POAG) OF LEFT EYE, MODERATE STAGE: ICD-10-CM

## 2021-08-09 DIAGNOSIS — Z98.890 S/P LASIK SURGERY: ICD-10-CM

## 2021-08-09 DIAGNOSIS — Z96.1 PSEUDOPHAKIA: Primary | ICD-10-CM

## 2021-08-09 PROCEDURE — 99024 POSTOP FOLLOW-UP VISIT: CPT | Performed by: STUDENT IN AN ORGANIZED HEALTH CARE EDUCATION/TRAINING PROGRAM

## 2021-08-09 ASSESSMENT — VISUAL ACUITY
OS_SC: 20/25
OS_SC+: -2
METHOD: SNELLEN - LINEAR
OD_SC+: -2SLOW
OD_SC: 20/30

## 2021-08-09 ASSESSMENT — EXTERNAL EXAM - LEFT EYE: OS_EXAM: NORMAL

## 2021-08-09 ASSESSMENT — TONOMETRY
IOP_METHOD: APPLANATION
OS_IOP_MMHG: 16
OD_IOP_MMHG: 16

## 2021-08-09 ASSESSMENT — REFRACTION_MANIFEST
OD_AXIS: 035
OD_CYLINDER: +0.50
OD_SPHERE: -1.00

## 2021-08-09 ASSESSMENT — SLIT LAMP EXAM - LIDS
COMMENTS: 1+ DERMATOCHALASIS
COMMENTS: 1+ DERMATOCHALASIS

## 2021-08-09 ASSESSMENT — EXTERNAL EXAM - RIGHT EYE: OD_EXAM: NORMAL

## 2021-08-09 NOTE — PATIENT INSTRUCTIONS
*   For the right eye, continue Vigamox or ofloxacin (tan top), ketorolac (grey top) and prednisolone (white or pink top) four times a day until each bottle runs out.     *  For the left eye, continue ketorolac (grey top) and prednisolone (white or pink top) four times a day until each bottle runs out.     *  Continue Continue Latanoprost (green top) at bedtime both eyes  *  Continue Cosopt (dorzolamide-timolol -- dark blue top) twice a day both eyes   *  Continue Brimonidine (purple top) twice a day both eyes      *   Stop wearing eye shield.    *   No eye rubbing. May resume heavy lifting.    *   Return in about one month for final exam with glasses check (as scheduled).    *   If your vision worsens, eye becomes increasingly red, or becomes painful, call [phone].    Lea Wheeler M.D.

## 2021-08-09 NOTE — PROGRESS NOTES
Current Eye Medications: Ketorolac, and Prednisolone 1% both eyes four times a day. Vigamox  right eye four times daily .    Cosopt (dorzolamide-timolol -- dark blue top) twice a day and Latanoprost (green top) at bedtime in both eyes and Brimonidine (purple top) twice a day both eyes      Subjective:  Status/Post Kelman Phacoemulsification with Posterior Chamber Lens right eye:  8-2-21. Patient notes right eye vision is good. States right eye is twitching today. Denies floaters and flashes.      Objective:  See Ophthalmology Exam.       Assessment:  Mona Richey is a 71 year old female who presents with:   Encounter Diagnoses   Name Primary?     Pseudophakia - Both Eyes POW1 s/p CE/IOL right eye and POW3 s/p CE/IOL left eye - both doing well. IOP 16/16 today.       S/P LASIK surgery OS (2001)      Primary open angle glaucoma (POAG) of left eye, moderate stage s/p ECP OU (July 2021 for both)        Plan:  *   For the right eye, continue Vigamox or ofloxacin (tan top), ketorolac (grey top) and prednisolone (white or pink top) four times a day until each bottle runs out.     *  For the left eye, continue ketorolac (grey top) and prednisolone (white or pink top) four times a day until each bottle runs out.     *  Continue Continue Latanoprost (green top) at bedtime both eyes  *  Continue Cosopt (dorzolamide-timolol -- dark blue top) twice a day both eyes   *  Continue Brimonidine (purple top) twice a day both eyes      *   Stop wearing eye shield.    *   No eye rubbing. May resume heavy lifting.    *   Return in about one month for final exam with glasses check (as scheduled).    *   If your vision worsens, eye becomes increasingly red, or becomes painful, call [phone].    Lea Wheeler M.D.

## 2021-08-09 NOTE — LETTER
8/9/2021         RE: Mona Richey  [address] [facility] [address]        Dear Colleague,    Thank you for referring your patient, Mona Richey, to the [facility]. Please see a copy of my visit note below.     Current Eye Medications: Ketorolac, and Prednisolone 1% both eyes four times a day. Vigamox  right eye four times daily .    Cosopt (dorzolamide-timolol -- dark blue top) twice a day and Latanoprost (green top) at bedtime in both eyes and Brimonidine (purple top) twice a day both eyes      Subjective:  Status/Post Kelman Phacoemulsification with Posterior Chamber Lens right eye:  8-2-21. Patient notes right eye vision is good. States right eye is twitching today. Denies floaters and flashes.      Objective:  See Ophthalmology Exam.       Assessment:  Mona Richey is a 71 year old female who presents with:   Encounter Diagnoses   Name Primary?     Pseudophakia - Both Eyes POW1 s/p CE/IOL right eye and POW3 s/p CE/IOL left eye - both doing well. IOP 16/16 today.       S/P LASIK surgery OS (2001)      Primary open angle glaucoma (POAG) of left eye, moderate stage s/p ECP OU (July 2021 for both)        Plan:  *   For the right eye, continue Vigamox or ofloxacin (tan top), ketorolac (grey top) and prednisolone (white or pink top) four times a day until each bottle runs out.     *  For the left eye, continue ketorolac (grey top) and prednisolone (white or pink top) four times a day until each bottle runs out.     *  Continue Continue Latanoprost (green top) at bedtime both eyes  *  Continue Cosopt (dorzolamide-timolol -- dark blue top) twice a day both eyes   *  Continue Brimonidine (purple top) twice a day both eyes      *   Stop wearing eye shield.    *   No eye rubbing. May resume heavy lifting.    *   Return in about one month for final exam with glasses check (as scheduled).    *   If your vision worsens, eye becomes increasingly red, or becomes painful, call  [phone].    Lea Wheeler M.D.        Again, thank you for allowing me to participate in the care of your patient.        Sincerely,        Lea Wheeler MD

## 2021-09-01 ENCOUNTER — OFFICE VISIT (OUTPATIENT)
Dept: OPHTHALMOLOGY | Facility: CLINIC | Age: 71
End: 2021-09-01
Payer: MEDICARE

## 2021-09-01 DIAGNOSIS — Z96.1 PSEUDOPHAKIA: Primary | ICD-10-CM

## 2021-09-01 DIAGNOSIS — Z98.890 S/P LASIK SURGERY: ICD-10-CM

## 2021-09-01 DIAGNOSIS — H40.1122 PRIMARY OPEN ANGLE GLAUCOMA (POAG) OF LEFT EYE, MODERATE STAGE: ICD-10-CM

## 2021-09-01 DIAGNOSIS — H43.813 POSTERIOR VITREOUS DETACHMENT OF BOTH EYES: ICD-10-CM

## 2021-09-01 PROCEDURE — 99024 POSTOP FOLLOW-UP VISIT: CPT | Performed by: STUDENT IN AN ORGANIZED HEALTH CARE EDUCATION/TRAINING PROGRAM

## 2021-09-01 ASSESSMENT — VISUAL ACUITY
METHOD: SNELLEN - LINEAR
OD_SC: 20/30
OS_SC+: -2
OS_SC: 20/30
OD_SC+: -1

## 2021-09-01 ASSESSMENT — REFRACTION_MANIFEST
OS_CYLINDER: +0.75
OD_AXIS: 030
OD_ADD: +2.75
OS_SPHERE: -1.00
OD_CYLINDER: +0.75
OS_AXIS: 110
OS_ADD: +2.75
OD_SPHERE: -1.00

## 2021-09-01 ASSESSMENT — EXTERNAL EXAM - RIGHT EYE: OD_EXAM: NORMAL

## 2021-09-01 ASSESSMENT — SLIT LAMP EXAM - LIDS
COMMENTS: 1+ DERMATOCHALASIS
COMMENTS: 1+ DERMATOCHALASIS

## 2021-09-01 ASSESSMENT — TONOMETRY
OD_IOP_MMHG: 16
OS_IOP_MMHG: 16
IOP_METHOD: APPLANATION

## 2021-09-01 ASSESSMENT — CUP TO DISC RATIO
OD_RATIO: 0.45
OS_RATIO: 0.65

## 2021-09-01 ASSESSMENT — EXTERNAL EXAM - LEFT EYE: OS_EXAM: NORMAL

## 2021-09-01 NOTE — PATIENT INSTRUCTIONS
Continue Latanoprost (green top) at bedtime both eyes     Continue Cosopt (dorzolamide-timolol -- dark blue top) twice a day both eyes    Continue Brimonidine (purple top) twice a day both eyes     Continue prednisolone (white or pink top) eyedrops four times a day in both eyes until the bottles run out    Continue artificial tears up to four times a day as needed or may try Zaditor twice a day as needed for eye itchiness/scratchiness (over-the-counter eyedrop).     Lea Wheeler MD  [phone]

## 2021-09-01 NOTE — LETTER
9/1/2021         RE: Mona Richey  [address] [facility] [address]        Dear Colleague,    Thank you for referring your patient, Mona Richey, to the [facility]. Please see a copy of my visit note below.     Current Eye Medications: Cosopt twice a day both eyes and Latanoprost at bedtime in both eyes and Brimonidine twice a day both eyes  Prednisolone QID both eyes       Subjective:  Here for final MR both eyes. Last night noticed that the eyes are getting scratchy and somewhat red, jose carlos right eye. Does have ragweed allergies, wondering if that could be the cause.      Objective:  See Ophthalmology Exam.      Assessment:  Mona Richey is a 71 year old female who presents with:   Encounter Diagnoses   Name Primary?     Pseudophakia - Both Eyes Final MR both eyes - doing well.       S/P LASIK surgery OS (2001)      Primary open angle glaucoma (POAG) of left eye, moderate stage s/p ECP OU (July 2021 for both) Intraocular pressure 16/16 today.      Posterior vitreous detachment of both eyes        Plan:  Continue Latanoprost (green top) at bedtime both eyes     Continue Cosopt (dorzolamide-timolol -- dark blue top) twice a day both eyes    Continue Brimonidine (purple top) twice a day both eyes     Continue prednisolone (white or pink top) eyedrops four times a day in both eyes until the bottles run out    Continue artificial tears up to four times a day as needed or may try Zaditor twice a day as needed for eye itchiness/scratchiness (over-the-counter eyedrop).     Lea Wheeler MD  [phone]               Again, thank you for allowing me to participate in the care of your patient.        Sincerely,        Lea Wheeler MD

## 2021-09-01 NOTE — PROGRESS NOTES
Current Eye Medications: Cosopt twice a day both eyes and Latanoprost at bedtime in both eyes and Brimonidine twice a day both eyes  Prednisolone QID both eyes       Subjective:  Here for final MR both eyes. Last night noticed that the eyes are getting scratchy and somewhat red, jose carlos right eye. Does have ragweed allergies, wondering if that could be the cause.      Objective:  See Ophthalmology Exam.      Assessment:  Mona Richey is a 71 year old female who presents with:   Encounter Diagnoses   Name Primary?     Pseudophakia - Both Eyes Final MR both eyes - doing well.       S/P LASIK surgery OS (2001)      Primary open angle glaucoma (POAG) of left eye, moderate stage s/p ECP OU (July 2021 for both) Intraocular pressure 16/16 today.      Posterior vitreous detachment of both eyes        Plan:  Continue Latanoprost (green top) at bedtime both eyes     Continue Cosopt (dorzolamide-timolol -- dark blue top) twice a day both eyes    Continue Brimonidine (purple top) twice a day both eyes     Continue prednisolone (white or pink top) eyedrops four times a day in both eyes until the bottles run out    Continue artificial tears up to four times a day as needed or may try Zaditor twice a day as needed for eye itchiness/scratchiness (over-the-counter eyedrop).     Lea Wheeler MD  [phone]

## 2021-09-09 ENCOUNTER — TELEPHONE (OUTPATIENT)
Dept: OPHTHALMOLOGY | Facility: CLINIC | Age: 71
End: 2021-09-09

## 2021-09-09 DIAGNOSIS — H40.1131 PRIMARY OPEN ANGLE GLAUCOMA OF BOTH EYES, MILD STAGE: ICD-10-CM

## 2021-09-09 RX ORDER — DORZOLAMIDE HYDROCHLORIDE AND TIMOLOL MALEATE 20; 5 MG/ML; MG/ML
1 SOLUTION/ DROPS OPHTHALMIC 2 TIMES DAILY
Qty: 30 ML | Refills: 4 | Status: SHIPPED | OUTPATIENT
Start: 2021-09-09 | End: 2022-07-27

## 2021-09-09 RX ORDER — BRIMONIDINE TARTRATE 2 MG/ML
1 SOLUTION/ DROPS OPHTHALMIC 2 TIMES DAILY
Qty: 15 ML | Refills: 4 | Status: SHIPPED | OUTPATIENT
Start: 2021-09-09 | End: 2022-07-27

## 2021-09-09 RX ORDER — LATANOPROST 50 UG/ML
1 SOLUTION/ DROPS OPHTHALMIC AT BEDTIME
Qty: 7.5 ML | Refills: 4 | Status: SHIPPED | OUTPATIENT
Start: 2021-09-09 | End: 2022-07-27

## 2021-09-09 NOTE — TELEPHONE ENCOUNTER
Received refill request for 90 days of Latanoprost, Brimonidine, and Dorzolamide. Will send to [facility]onal for her.

## 2021-09-09 NOTE — TELEPHONE ENCOUNTER
Patient is calling to refill medications and is asking for a 90 day supply for-Latanoprost, Brimonidine, and Dorzolanide. She uses [facility] in [place]. Any questions please call patient at [phone].

## 2021-10-27 ENCOUNTER — OFFICE VISIT (OUTPATIENT)
Dept: OPHTHALMOLOGY | Facility: CLINIC | Age: 71
End: 2021-10-27
Payer: MEDICARE

## 2021-10-27 DIAGNOSIS — Z96.1 PSEUDOPHAKIA: ICD-10-CM

## 2021-10-27 DIAGNOSIS — H40.1131 PRIMARY OPEN ANGLE GLAUCOMA OF BOTH EYES, MILD STAGE: Primary | ICD-10-CM

## 2021-10-27 DIAGNOSIS — Z98.890 S/P LASIK SURGERY: ICD-10-CM

## 2021-10-27 PROCEDURE — 92012 INTRM OPH EXAM EST PATIENT: CPT | Mod: 24 | Performed by: STUDENT IN AN ORGANIZED HEALTH CARE EDUCATION/TRAINING PROGRAM

## 2021-10-27 PROCEDURE — 92083 EXTENDED VISUAL FIELD XM: CPT | Performed by: STUDENT IN AN ORGANIZED HEALTH CARE EDUCATION/TRAINING PROGRAM

## 2021-10-27 PROCEDURE — 92133 CPTRZD OPH DX IMG PST SGM ON: CPT | Performed by: STUDENT IN AN ORGANIZED HEALTH CARE EDUCATION/TRAINING PROGRAM

## 2021-10-27 ASSESSMENT — EXTERNAL EXAM - LEFT EYE: OS_EXAM: NORMAL

## 2021-10-27 ASSESSMENT — TONOMETRY
OS_IOP_MMHG: 16
IOP_METHOD: APPLANATION
OD_IOP_MMHG: 17

## 2021-10-27 ASSESSMENT — VISUAL ACUITY
OD_SC+: -1
OS_SC: 20/30
OS_SC+: -1
OD_SC: 20/30
METHOD: SNELLEN - LINEAR

## 2021-10-27 ASSESSMENT — CUP TO DISC RATIO
OS_RATIO: 0.65
OD_RATIO: 0.45

## 2021-10-27 ASSESSMENT — SLIT LAMP EXAM - LIDS
COMMENTS: 1+ DERMATOCHALASIS
COMMENTS: 1+ DERMATOCHALASIS

## 2021-10-27 ASSESSMENT — EXTERNAL EXAM - RIGHT EYE: OD_EXAM: NORMAL

## 2021-10-27 NOTE — PATIENT INSTRUCTIONS
Continue Latanoprost (green top) at bedtime both eyes      Continue Cosopt (dorzolamide-timolol -- dark blue top) twice a day both eyes     Continue Brimonidine (purple top) twice a day both eyes       Continue artificial tears up to four times a day as needed or may try Zaditor twice a day as needed for eye itchiness/scratchiness (over-the-counter eyedrop).     May try Zaditor twice a day as needed for eye itchiness (over-the-counter eyedrop).      Lea Wheeler MD  [phone]

## 2021-10-27 NOTE — LETTER
10/27/2021         RE: Mona Richey  [address] [facility] [address]        Dear Colleague,    Thank you for referring your patient, Mona Richey, to the [facility]. Please see a copy of my visit note below.     Current Eye Medications:  Latanoprost (green top) at bedtime both eyes Cosopt (dorzolamide-timolol -- dark blue top) twice a day both eyes and Brimonidine (purple top) twice a day both eyes     Artificial tears up to four times a day both eyes     Subjective:  Here for HVF/OCT and IOP for glaucoma. Has been having some itchy eyes, has not tried the Zaditor though for her allergies. Things are much brighter and clearer since cataract surgery. Going to [place] soon.      Objective:  See Ophthalmology Exam.      Assessment:  Mona Richey is a 71 year old female who presents with:   Encounter Diagnoses   Name Primary?     Primary open angle glaucoma of both eyes, mild stage Intraocular pressure 17/16 today. S/p ECP both eyes. Continue same medications.     OCT optic nerve: avg retinal nerve fiber layer 93/86; within normal limits right eye; thin S left eye - stable both eyes.     Ruby visual field (HVF) 24-2: inf nasal step left>right.      Pseudophakia - Both Eyes       S/P LASIK surgery OS (2001)        Plan:  Continue Latanoprost (green top) at bedtime both eyes      Continue Cosopt (dorzolamide-timolol -- dark blue top) twice a day both eyes     Continue Brimonidine (purple top) twice a day both eyes       Continue artificial tears up to four times a day as needed or may try Zaditor twice a day as needed for eye itchiness/scratchiness (over-the-counter eyedrop).     May try Zaditor twice a day as needed for eye itchiness (over-the-counter eyedrop).      Lea Wheeler MD  [phone]             Again, thank you for allowing me to participate in the care of your patient.        Sincerely,        Lea Wheeler MD

## 2021-10-27 NOTE — PROGRESS NOTES
Current Eye Medications:  Latanoprost (green top) at bedtime both eyes Cosopt (dorzolamide-timolol -- dark blue top) twice a day both eyes and Brimonidine (purple top) twice a day both eyes     Artificial tears up to four times a day both eyes     Subjective:  Here for HVF/OCT and IOP for glaucoma. Has been having some itchy eyes, has not tried the Zaditor though for her allergies. Things are much brighter and clearer since cataract surgery. Going to [place] soon.      Objective:  See Ophthalmology Exam.      Assessment:  Mona Richey is a 71 year old female who presents with:   Encounter Diagnoses   Name Primary?     Primary open angle glaucoma of both eyes, mild stage Intraocular pressure 17/16 today. S/p ECP both eyes. Continue same medications.     OCT optic nerve: avg retinal nerve fiber layer 93/86; within normal limits right eye; thin S left eye - stable both eyes.     Ruby visual field (HVF) 24-2: inf nasal step left>right.      Pseudophakia - Both Eyes       S/P LASIK surgery OS (2001)        Plan:  Continue Latanoprost (green top) at bedtime both eyes      Continue Cosopt (dorzolamide-timolol -- dark blue top) twice a day both eyes     Continue Brimonidine (purple top) twice a day both eyes       Continue artificial tears up to four times a day as needed or may try Zaditor twice a day as needed for eye itchiness/scratchiness (over-the-counter eyedrop).     May try Zaditor twice a day as needed for eye itchiness (over-the-counter eyedrop).      Lea Wheeler MD  [phone]

## 2022-07-27 ENCOUNTER — OFFICE VISIT (OUTPATIENT)
Dept: OPHTHALMOLOGY | Facility: CLINIC | Age: 72
End: 2022-07-27
Payer: MEDICARE

## 2022-07-27 DIAGNOSIS — Z96.1 PSEUDOPHAKIA: ICD-10-CM

## 2022-07-27 DIAGNOSIS — H40.1131 PRIMARY OPEN ANGLE GLAUCOMA OF BOTH EYES, MILD STAGE: Primary | ICD-10-CM

## 2022-07-27 DIAGNOSIS — H52.223 MYOPIA OF BOTH EYES WITH REGULAR ASTIGMATISM AND PRESBYOPIA: ICD-10-CM

## 2022-07-27 DIAGNOSIS — Z98.890 S/P LASIK SURGERY: ICD-10-CM

## 2022-07-27 DIAGNOSIS — H52.4 MYOPIA OF BOTH EYES WITH REGULAR ASTIGMATISM AND PRESBYOPIA: ICD-10-CM

## 2022-07-27 DIAGNOSIS — H52.13 MYOPIA OF BOTH EYES WITH REGULAR ASTIGMATISM AND PRESBYOPIA: ICD-10-CM

## 2022-07-27 DIAGNOSIS — H43.813 POSTERIOR VITREOUS DETACHMENT OF BOTH EYES: ICD-10-CM

## 2022-07-27 PROCEDURE — 92015 DETERMINE REFRACTIVE STATE: CPT | Performed by: STUDENT IN AN ORGANIZED HEALTH CARE EDUCATION/TRAINING PROGRAM

## 2022-07-27 PROCEDURE — 92014 COMPRE OPH EXAM EST PT 1/>: CPT | Performed by: STUDENT IN AN ORGANIZED HEALTH CARE EDUCATION/TRAINING PROGRAM

## 2022-07-27 RX ORDER — LATANOPROST 50 UG/ML
1 SOLUTION/ DROPS OPHTHALMIC AT BEDTIME
Qty: 7.5 ML | Refills: 4 | Status: SHIPPED | OUTPATIENT
Start: 2022-07-27 | End: 2022-09-20

## 2022-07-27 RX ORDER — DORZOLAMIDE HYDROCHLORIDE AND TIMOLOL MALEATE 20; 5 MG/ML; MG/ML
1 SOLUTION/ DROPS OPHTHALMIC 2 TIMES DAILY
Qty: 30 ML | Refills: 4 | Status: SHIPPED | OUTPATIENT
Start: 2022-07-27 | End: 2023-08-18

## 2022-07-27 RX ORDER — BRIMONIDINE TARTRATE 2 MG/ML
1 SOLUTION/ DROPS OPHTHALMIC 2 TIMES DAILY
Qty: 15 ML | Refills: 4 | Status: SHIPPED | OUTPATIENT
Start: 2022-07-27 | End: 2022-12-30

## 2022-07-27 ASSESSMENT — VISUAL ACUITY
OS_SC+: -2
OD_SC+: -2
OD_SC: 20/30
OS_SC: 20/30
METHOD: SNELLEN - LINEAR

## 2022-07-27 ASSESSMENT — CUP TO DISC RATIO
OD_RATIO: 0.45
OS_RATIO: 0.65

## 2022-07-27 ASSESSMENT — REFRACTION_MANIFEST
OS_CYLINDER: +0.75
OS_SPHERE: -0.50
OD_SPHERE: -0.75
OS_AXIS: 110
OD_AXIS: 035
OS_ADD: +3.00
OD_CYLINDER: +0.25
OD_ADD: +3.00

## 2022-07-27 ASSESSMENT — CONF VISUAL FIELD
METHOD: COUNTING FINGERS
OS_NORMAL: 1
OD_NORMAL: 1

## 2022-07-27 ASSESSMENT — EXTERNAL EXAM - RIGHT EYE: OD_EXAM: NORMAL

## 2022-07-27 ASSESSMENT — TONOMETRY
OS_IOP_MMHG: 12
IOP_METHOD: APPLANATION
OD_IOP_MMHG: 14

## 2022-07-27 ASSESSMENT — EXTERNAL EXAM - LEFT EYE: OS_EXAM: NORMAL

## 2022-07-27 ASSESSMENT — SLIT LAMP EXAM - LIDS
COMMENTS: 1+ DERMATOCHALASIS
COMMENTS: 1+ DERMATOCHALASIS

## 2022-07-27 NOTE — PATIENT INSTRUCTIONS
Continue Latanoprost (green top) at bedtime both eyes     Continue Cosopt (dorzolamide-timolol -- dark blue top) twice a day both eyes    Continue Brimonidine (purple top) twice a day both eyes     Continue over the counter readers or can fill glasses prescription given     Lea Wheeler MD  [phone]

## 2022-07-27 NOTE — LETTER
7/27/2022         RE: Mona Richey  [address] [facility] [address]        Dear Colleague,    Thank you for referring your patient, Mona Richey, to the [facility]. Please see a copy of my visit note below.    Current Eye Medications:   Latanoprost - both eyes at bedtime - last dose: 9pm last night.   Cosopt - both eyes BID - last dose: 1 wk ago  Brimonidine - both eyes BID - last dose: 7am today.     Subjective:  Complete dilated exam - hx POAG. Compliant with daily use of drops- was hospitalized last week - staff lost bottle of Cosopt. Pt is happy with her vision without correction at distance - only using OTC readers for near work.     Objective:  See Ophthalmology Exam.      Assessment:  Mona Richey is a 72 year old female who presents with:   Encounter Diagnoses   Name Primary?     Primary open angle glaucoma of both eyes, mild stage Yes    Intraocular pressure 14/12 today. Continue same medications.     Pseudophakia - Both Eyes       S/P LASIK surgery OS (2001)      Posterior vitreous detachment of both eyes      Myopia of both eyes with regular astigmatism and presbyopia        Plan:  Continue Latanoprost (green top) at bedtime both eyes     Continue Cosopt (dorzolamide-timolol -- dark blue top) twice a day both eyes    Continue Brimonidine (purple top) twice a day both eyes     Continue over the counter readers or can fill glasses prescription given     Lea Wheeler MD  [phone]          Again, thank you for allowing me to participate in the care of your patient.        Sincerely,        Lea Wheeler MD

## 2022-07-27 NOTE — PROGRESS NOTES
Current Eye Medications:   Latanoprost - both eyes at bedtime - last dose: 9pm last night.   Cosopt - both eyes BID - last dose: 1 wk ago  Brimonidine - both eyes BID - last dose: 7am today.     Subjective:  Complete dilated exam - hx POAG. Compliant with daily use of drops- was hospitalized last week - staff lost bottle of Cosopt. Pt is happy with her vision without correction at distance - only using OTC readers for near work.     Objective:  See Ophthalmology Exam.      Assessment:  Mona Richey is a 72 year old female who presents with:   Encounter Diagnoses   Name Primary?     Primary open angle glaucoma of both eyes, mild stage Yes    Intraocular pressure 14/12 today. Continue same medications.     Pseudophakia - Both Eyes       S/P LASIK surgery OS (2001)      Posterior vitreous detachment of both eyes      Myopia of both eyes with regular astigmatism and presbyopia        Plan:  Continue Latanoprost (green top) at bedtime both eyes     Continue Cosopt (dorzolamide-timolol -- dark blue top) twice a day both eyes    Continue Brimonidine (purple top) twice a day both eyes     Continue over the counter readers or can fill glasses prescription given     Lea Wheeler MD  [phone]

## 2022-09-20 DIAGNOSIS — H40.1131 PRIMARY OPEN ANGLE GLAUCOMA OF BOTH EYES, MILD STAGE: ICD-10-CM

## 2022-09-20 RX ORDER — LATANOPROST 50 UG/ML
1 SOLUTION/ DROPS OPHTHALMIC AT BEDTIME
Qty: 7.5 ML | Refills: 0 | Status: SHIPPED | OUTPATIENT
Start: 2022-09-20 | End: 2022-12-13

## 2022-09-20 NOTE — TELEPHONE ENCOUNTER
Refilling drops per Dr. Wheeler's last visit note 7/27/22: Continue Latanoprost (green top) at bedtime both eyes. Patient cancelled last two visits, but has follow up scheduled 12/13/22.

## 2022-11-30 NOTE — ANESTHESIA CARE TRANSFER NOTE
Patient: Mona Richey    Procedure(s):  LEFT EYE PHACOEMULSIFICATION, CATARACT, CLEAR CORNEAL INCISION APPROACH, WITH STANDARD INTRAOCULAR LENS IMPLANT INSERTION AND ENDOSCOPIC CYCLOPHOTOCOAGULATION    Diagnosis: Combined form of age-related cataract, left eye [H25.812]  Primary open angle glaucoma (POAG) of left eye, moderate stage [H40.1122]  Diagnosis Additional Information: No value filed.    Anesthesia Type:   MAC     Note:    Oropharynx: spontaneously breathing  Level of Consciousness: awake  Oxygen Supplementation: room air    Independent Airway: airway patency satisfactory and stable  Dentition: dentition unchanged  Vital Signs Stable: post-procedure vital signs reviewed and stable  Report to RN Given: handoff report given  Patient transferred to: Phase II    Handoff Report: Identifed the Patient, Identified the Reponsible Provider, Reviewed the pertinent medical history, Discussed the surgical course, Reviewed Intra-OP anesthesia mangement and issues during anesthesia, Set expectations for post-procedure period and Allowed opportunity for questions and acknowledgement of understanding      Vitals: (Last set prior to Anesthesia Care Transfer)  CRNA VITALS  7/19/2021 0949 - 7/19/2021 1022      7/19/2021             Pulse:  66    Ht Rate:  66    SpO2:  100 %        Electronically Signed By: MIGUEL Dukes CRNA  July 19, 2021  10:22 AM  
Home

## 2022-12-13 ENCOUNTER — OFFICE VISIT (OUTPATIENT)
Dept: OPHTHALMOLOGY | Facility: CLINIC | Age: 72
End: 2022-12-13
Payer: MEDICARE

## 2022-12-13 DIAGNOSIS — Z98.890 S/P LASIK SURGERY: ICD-10-CM

## 2022-12-13 DIAGNOSIS — H43.813 POSTERIOR VITREOUS DETACHMENT OF BOTH EYES: ICD-10-CM

## 2022-12-13 DIAGNOSIS — H40.1131 PRIMARY OPEN ANGLE GLAUCOMA OF BOTH EYES, MILD STAGE: Primary | ICD-10-CM

## 2022-12-13 DIAGNOSIS — Z96.1 PSEUDOPHAKIA: ICD-10-CM

## 2022-12-13 PROCEDURE — 92133 CPTRZD OPH DX IMG PST SGM ON: CPT | Performed by: STUDENT IN AN ORGANIZED HEALTH CARE EDUCATION/TRAINING PROGRAM

## 2022-12-13 PROCEDURE — 92012 INTRM OPH EXAM EST PATIENT: CPT | Performed by: STUDENT IN AN ORGANIZED HEALTH CARE EDUCATION/TRAINING PROGRAM

## 2022-12-13 PROCEDURE — 92083 EXTENDED VISUAL FIELD XM: CPT | Performed by: STUDENT IN AN ORGANIZED HEALTH CARE EDUCATION/TRAINING PROGRAM

## 2022-12-13 RX ORDER — LATANOPROST 50 UG/ML
1 SOLUTION/ DROPS OPHTHALMIC AT BEDTIME
Qty: 7.5 ML | Refills: 3 | Status: SHIPPED | OUTPATIENT
Start: 2022-12-13 | End: 2023-08-18

## 2022-12-13 ASSESSMENT — VISUAL ACUITY
OS_SC+: +2
OS_SC: 20/25
OD_SC: 20/25
METHOD: SNELLEN - LINEAR
OD_SC+: -2

## 2022-12-13 ASSESSMENT — TONOMETRY
OS_IOP_MMHG: 16
OD_IOP_MMHG: 17
IOP_METHOD: APPLANATION

## 2022-12-13 ASSESSMENT — CUP TO DISC RATIO
OS_RATIO: 0.65
OD_RATIO: 0.45

## 2022-12-13 ASSESSMENT — SLIT LAMP EXAM - LIDS
COMMENTS: 1+ DERMATOCHALASIS
COMMENTS: 1+ DERMATOCHALASIS

## 2022-12-13 ASSESSMENT — EXTERNAL EXAM - LEFT EYE: OS_EXAM: NORMAL

## 2022-12-13 ASSESSMENT — EXTERNAL EXAM - RIGHT EYE: OD_EXAM: NORMAL

## 2022-12-13 NOTE — PATIENT INSTRUCTIONS
Continue Latanoprost (green top) at bedtime both eyes     Continue Cosopt (dorzolamide-timolol -- dark blue top) twice a day both eyes    Continue Brimonidine (purple top) twice a day both eyes     Lea Wheeler MD  [phone]

## 2022-12-13 NOTE — PROGRESS NOTES
Current Eye Medications:  Latanoprost both eyes every evening, Cosopt and Brimonidine both eyes twice a day.  Last drops:  7am.       Subjective:  Follow up Open Angle Glaucoma.  Patient is here for a pressure check, OCT, and visual field.  Her eyes are feeling slightly gritty today, due to allergies.  Vision appears to be about the same in each eye.       Objective:  See Ophthalmology Exam.      Assessment:  Mona Richey is a 72 year old female who presents with:   Encounter Diagnoses   Name Primary?     Primary open angle glaucoma of both eyes, mild stage Intraocular pressure 17/16 today. Stable OCT compared to 2017 both eyes. Continue same medications.    OCT optic nerve: avg retinal nerve fiber layer 84/82; within normal limits right eye; thin sup left eye; stable both eyes compared to 2017.     Ruby visual field (HVF) 24-2: v mild inf arcuate defect right eye; inf nasal step left eye.      Pseudophakia - Both Eyes       S/P LASIK surgery OS (2001)      Posterior vitreous detachment of both eyes        Plan:  Continue Latanoprost (green top) at bedtime both eyes     Continue Cosopt (dorzolamide-timolol -- dark blue top) twice a day both eyes    Continue Brimonidine (purple top) twice a day both eyes     Lea Wheeler MD  [phone]

## 2022-12-13 NOTE — LETTER
12/13/2022         RE: Mona Richey  [address] [facility] [address]        Dear Colleague,    Thank you for referring your patient, Mona Richey, to the [facility]. Please see a copy of my visit note below.     Current Eye Medications:  Latanoprost both eyes every evening, Cosopt and Brimonidine both eyes twice a day.  Last drops:  7am.       Subjective:  Follow up Open Angle Glaucoma.  Patient is here for a pressure check, OCT, and visual field.  Her eyes are feeling slightly gritty today, due to allergies.  Vision appears to be about the same in each eye.       Objective:  See Ophthalmology Exam.      Assessment:  Mona Richey is a 72 year old female who presents with:   Encounter Diagnoses   Name Primary?     Primary open angle glaucoma of both eyes, mild stage Intraocular pressure 17/16 today. Stable OCT compared to 2017 both eyes. Continue same medications.    OCT optic nerve: avg retinal nerve fiber layer 84/82; within normal limits right eye; thin sup left eye; stable both eyes compared to 2017.     Ruby visual field (HVF) 24-2: v mild inf arcuate defect right eye; inf nasal step left eye.      Pseudophakia - Both Eyes       S/P LASIK surgery OS (2001)      Posterior vitreous detachment of both eyes        Plan:  Continue Latanoprost (green top) at bedtime both eyes     Continue Cosopt (dorzolamide-timolol -- dark blue top) twice a day both eyes    Continue Brimonidine (purple top) twice a day both eyes     Lea Wheeler MD  [phone]            Again, thank you for allowing me to participate in the care of your patient.        Sincerely,        Lea Wheeler MD

## 2022-12-30 DIAGNOSIS — H40.1131 PRIMARY OPEN ANGLE GLAUCOMA OF BOTH EYES, MILD STAGE: ICD-10-CM

## 2022-12-30 RX ORDER — BRIMONIDINE TARTRATE 2 MG/ML
1 SOLUTION/ DROPS OPHTHALMIC 2 TIMES DAILY
Qty: 15 ML | Refills: 4 | Status: SHIPPED | OUTPATIENT
Start: 2022-12-30 | End: 2023-08-18

## 2023-08-18 ENCOUNTER — OFFICE VISIT (OUTPATIENT)
Dept: OPHTHALMOLOGY | Facility: CLINIC | Age: 73
End: 2023-08-18
Payer: MEDICARE

## 2023-08-18 DIAGNOSIS — Z98.890 S/P LASIK SURGERY: ICD-10-CM

## 2023-08-18 DIAGNOSIS — Z96.1 PSEUDOPHAKIA: ICD-10-CM

## 2023-08-18 DIAGNOSIS — H43.813 POSTERIOR VITREOUS DETACHMENT OF BOTH EYES: ICD-10-CM

## 2023-08-18 DIAGNOSIS — H52.4 MYOPIA OF BOTH EYES WITH REGULAR ASTIGMATISM AND PRESBYOPIA: ICD-10-CM

## 2023-08-18 DIAGNOSIS — H52.13 MYOPIA OF BOTH EYES WITH REGULAR ASTIGMATISM AND PRESBYOPIA: ICD-10-CM

## 2023-08-18 DIAGNOSIS — H52.223 MYOPIA OF BOTH EYES WITH REGULAR ASTIGMATISM AND PRESBYOPIA: ICD-10-CM

## 2023-08-18 DIAGNOSIS — H40.1131 PRIMARY OPEN ANGLE GLAUCOMA OF BOTH EYES, MILD STAGE: Primary | ICD-10-CM

## 2023-08-18 PROCEDURE — 92014 COMPRE OPH EXAM EST PT 1/>: CPT | Performed by: STUDENT IN AN ORGANIZED HEALTH CARE EDUCATION/TRAINING PROGRAM

## 2023-08-18 PROCEDURE — 92015 DETERMINE REFRACTIVE STATE: CPT | Mod: GY | Performed by: STUDENT IN AN ORGANIZED HEALTH CARE EDUCATION/TRAINING PROGRAM

## 2023-08-18 RX ORDER — BRIMONIDINE TARTRATE 2 MG/ML
1 SOLUTION/ DROPS OPHTHALMIC 2 TIMES DAILY
Qty: 15 ML | Refills: 4 | Status: SHIPPED | OUTPATIENT
Start: 2023-08-18

## 2023-08-18 RX ORDER — LATANOPROST 50 UG/ML
1 SOLUTION/ DROPS OPHTHALMIC AT BEDTIME
Qty: 7.5 ML | Refills: 3 | Status: SHIPPED | OUTPATIENT
Start: 2023-08-18 | End: 2023-09-19

## 2023-08-18 RX ORDER — DORZOLAMIDE HYDROCHLORIDE AND TIMOLOL MALEATE 20; 5 MG/ML; MG/ML
1 SOLUTION/ DROPS OPHTHALMIC 2 TIMES DAILY
Qty: 30 ML | Refills: 4 | Status: SHIPPED | OUTPATIENT
Start: 2023-08-18 | End: 2023-12-13

## 2023-08-18 ASSESSMENT — SLIT LAMP EXAM - LIDS
COMMENTS: 1+ DERMATOCHALASIS
COMMENTS: 1+ DERMATOCHALASIS

## 2023-08-18 ASSESSMENT — CONF VISUAL FIELD
OD_INFERIOR_TEMPORAL_RESTRICTION: 0
OS_NORMAL: 1
OS_INFERIOR_TEMPORAL_RESTRICTION: 0
OD_SUPERIOR_NASAL_RESTRICTION: 0
OS_SUPERIOR_NASAL_RESTRICTION: 0
METHOD: COUNTING FINGERS
OD_NORMAL: 1
OD_SUPERIOR_TEMPORAL_RESTRICTION: 0
OD_INFERIOR_NASAL_RESTRICTION: 0
OS_INFERIOR_NASAL_RESTRICTION: 0
OS_SUPERIOR_TEMPORAL_RESTRICTION: 0

## 2023-08-18 ASSESSMENT — VISUAL ACUITY
OD_SC+: -1
OS_SC+: +2
OS_SC: 20/30
OD_SC: 20/50
METHOD: SNELLEN - LINEAR
OD_PH_SC: 20/25

## 2023-08-18 ASSESSMENT — EXTERNAL EXAM - LEFT EYE: OS_EXAM: NORMAL

## 2023-08-18 ASSESSMENT — TONOMETRY
OS_IOP_MMHG: 13
IOP_METHOD: APPLANATION
OD_IOP_MMHG: 14

## 2023-08-18 ASSESSMENT — REFRACTION_MANIFEST
OS_CYLINDER: +0.50
OD_ADD: +2.50
OS_ADD: +2.50
OD_CYLINDER: +0.75
OD_SPHERE: -1.00
OS_AXIS: 125
OD_AXIS: 045
OS_SPHERE: -0.25

## 2023-08-18 ASSESSMENT — CUP TO DISC RATIO
OS_RATIO: 0.65
OD_RATIO: 0.45

## 2023-08-18 ASSESSMENT — EXTERNAL EXAM - RIGHT EYE: OD_EXAM: NORMAL

## 2023-08-18 NOTE — PATIENT INSTRUCTIONS
Continue Latanoprost (green top) at bedtime both eyes     Continue Cosopt (dorzolamide-timolol -- dark blue top) twice a day both eyes    Continue Brimonidine (purple top) twice a day both eyes     Continue over the counter readers or can fill glasses prescription given     Lea Wheeler MD  [phone]

## 2023-08-18 NOTE — LETTER
8/18/2023         RE: Mona Richey  [address] [facility] [address]        Dear Colleague,    Thank you for referring your patient, Mona Richey, to the [facility]. Please see a copy of my visit note below.     Current Eye Medications:  latanoprost - both eyes at bedtime - last dose: 10pm last night  Dorzolamide-timolol - both eyes BID - last dose: 7am today  Brimonidine - both eyes BID - last dose: 7am today     Subjective:  comprehensive eye exam - vision overall stable, consistent with daily use of drops. No pain or discomfort either eye.      Objective:  See Ophthalmology Exam.       Assessment:  Mona Richey is a 73 year old female who presents with:   Encounter Diagnoses   Name Primary?     Primary open angle glaucoma of both eyes, mild stage Intraocular pressure 14/13 today. Continue same medications      Pseudophakia - Both Eyes       S/P LASIK surgery OS (2001)      Posterior vitreous detachment of both eyes      Myopia of both eyes with regular astigmatism and presbyopia        Plan:  Continue Latanoprost (green top) at bedtime both eyes     Continue Cosopt (dorzolamide-timolol -- dark blue top) twice a day both eyes    Continue Brimonidine (purple top) twice a day both eyes     Continue over the counter readers or can fill glasses prescription given     Lea Wheeler MD  [phone]        Again, thank you for allowing me to participate in the care of your patient.        Sincerely,        Lea Wheeler MD

## 2023-08-18 NOTE — PROGRESS NOTES
Current Eye Medications:  latanoprost - both eyes at bedtime - last dose: 10pm last night  Dorzolamide-timolol - both eyes BID - last dose: 7am today  Brimonidine - both eyes BID - last dose: 7am today     Subjective:  comprehensive eye exam - vision overall stable, consistent with daily use of drops. No pain or discomfort either eye.      Objective:  See Ophthalmology Exam.       Assessment:  Mona Richey is a 73 year old female who presents with:   Encounter Diagnoses   Name Primary?    Primary open angle glaucoma of both eyes, mild stage Intraocular pressure 14/13 today. Continue same medications     Pseudophakia - Both Eyes      S/P LASIK surgery OS (2001)     Posterior vitreous detachment of both eyes     Myopia of both eyes with regular astigmatism and presbyopia        Plan:  Continue Latanoprost (green top) at bedtime both eyes     Continue Cosopt (dorzolamide-timolol -- dark blue top) twice a day both eyes    Continue Brimonidine (purple top) twice a day both eyes     Continue over the counter readers or can fill glasses prescription given     Lea Wheeler MD  [phone]

## 2023-09-19 DIAGNOSIS — H40.1131 PRIMARY OPEN ANGLE GLAUCOMA OF BOTH EYES, MILD STAGE: ICD-10-CM

## 2023-09-19 RX ORDER — LATANOPROST 50 UG/ML
1 SOLUTION/ DROPS OPHTHALMIC AT BEDTIME
Qty: 7.5 ML | Refills: 3 | Status: SHIPPED | OUTPATIENT
Start: 2023-09-19

## 2023-12-13 DIAGNOSIS — H40.1131 PRIMARY OPEN ANGLE GLAUCOMA OF BOTH EYES, MILD STAGE: ICD-10-CM

## 2023-12-13 RX ORDER — DORZOLAMIDE HYDROCHLORIDE AND TIMOLOL MALEATE 20; 5 MG/ML; MG/ML
1 SOLUTION/ DROPS OPHTHALMIC 2 TIMES DAILY
Qty: 30 ML | Refills: 4 | Status: SHIPPED | OUTPATIENT
Start: 2023-12-13

## 2023-12-13 RX ORDER — DORZOLAMIDE HYDROCHLORIDE AND TIMOLOL MALEATE 20; 5 MG/ML; MG/ML
1 SOLUTION/ DROPS OPHTHALMIC 2 TIMES DAILY
Qty: 30 ML | Refills: 4 | Status: SHIPPED | OUTPATIENT
Start: 2023-12-13 | End: 2023-12-13

## 2023-12-13 NOTE — TELEPHONE ENCOUNTER
Refilling drops per Dr. Wheeler's last visit note 8/18/23: Continue Cosopt (dorzolamide-timolol -- dark blue top) twice a day both eyes.  Will have yinka try to schedule follow up

## 2024-04-26 NOTE — OP NOTE
Please send the letter to the patient with the following.         Here are your results for your recent labs. They are reassuring and continue the current medication dose. Please call or message me if you have questions or concerns.      PreOp Diagnosis: Visually significant nuclear sclerotic cataract left eye, primary open angle glaucoma left eye  PostOp Diagnosis: Same  Surgeon: Lea Wheeler MD  Implant: Tecnis [id number] 19.5D    Procedures:   1. Review of intraocular lens calculations, both eyes   2. Phacoemulsification and extraction of lens  left eye   3. Intraocular lens implantation left eye   4. Endoscopic cyclophotocoagulation, left eye  Anesthesia: MAC/topical  Complications: None  EBL: <1cc    Mona Richey suffers from a visually significant cataract of the left eye and primary open angle glaucoma left eye. This has caused problems with distance and reading vision, including glare. After discussing the risks, benefits, and alternatives, the patient wishes to proceed with cataract surgery.    The patient was identified in the pre-op area where the left eye was marked. The patient was then brought to the operating room where a time out was called, identifying the patient, the procedure, and the correct site. Tetracaine drops were applied to the operative eye. The operative eye was then prepped and draped in the usual sterile ophthalmic fashion. An eyelid speculum was placed into the operative eye. A paracentesis was made inferior with a side port blade. 1% preservative-free lidocaine and epinephrine was injected into the anterior chamber.  Viscoat was injected to deepen the anterior chamber. A 2.4mm clear corneal wound was created with a keratome blade temporally.  A continuous curvilinear capsulorrhexis was started with a bent cystitome and completed with Utrada forceps. Hydrodissection of the lens nucleus was performed with BSS on a cannula. The lens nucleus was rotated. Phacoemulsification of the lens nucleus was accomplished in a phacoemulsification stop and chop technique. Remaining cortex was removed with irrigation and aspiration. The lens capsule was noted to be intact. Provisc was used to inflate the capsular bag.  The lens was  injected into the capsular bag. Healon was injected into the ciliary sulcus to create iris bombe from 5 to 2 o'clock. The endoscopic photocoagulation probe was inserted into the anterior chamber, directed toward the sulcus, and used to treat the ciliary processes from 5 to 2 o'clock. Power was set to 250mW and 42 spots were applied to the ciliary processes. Remaining viscoelastic was removed with irrigation and aspiration. The wounds were checked and found to be watertight after hydration. Intracameral moxifloxacin was injected into the anterior chamber. The eyelid speculum was removed. The patient tolerated the procedure well and was in stable condition on the way to the recovery area.     Lea Wheeler MD

## (undated) RX ORDER — DEXAMETHASONE SODIUM PHOSPHATE 10 MG/ML
INJECTION, SOLUTION INTRAMUSCULAR; INTRAVENOUS
Status: DISPENSED
Start: 2021-08-02

## (undated) RX ORDER — FENTANYL CITRATE 50 UG/ML
INJECTION, SOLUTION INTRAMUSCULAR; INTRAVENOUS
Status: DISPENSED
Start: 2021-08-02

## (undated) RX ORDER — DEXAMETHASONE SODIUM PHOSPHATE 4 MG/ML
INJECTION, SOLUTION INTRA-ARTICULAR; INTRALESIONAL; INTRAMUSCULAR; INTRAVENOUS; SOFT TISSUE
Status: DISPENSED
Start: 2021-08-02

## (undated) RX ORDER — DEXMEDETOMIDINE HYDROCHLORIDE 4 UG/ML
INJECTION, SOLUTION INTRAVENOUS
Status: DISPENSED
Start: 2021-07-19

## (undated) RX ORDER — KETOROLAC TROMETHAMINE 30 MG/ML
INJECTION, SOLUTION INTRAMUSCULAR; INTRAVENOUS
Status: DISPENSED
Start: 2021-08-02

## (undated) RX ORDER — GLYCOPYRROLATE 0.2 MG/ML
INJECTION INTRAMUSCULAR; INTRAVENOUS
Status: DISPENSED
Start: 2021-08-02

## (undated) RX ORDER — PROPOFOL 10 MG/ML
INJECTION, EMULSION INTRAVENOUS
Status: DISPENSED
Start: 2021-08-02

## (undated) RX ORDER — LIDOCAINE HYDROCHLORIDE 20 MG/ML
INJECTION, SOLUTION EPIDURAL; INFILTRATION; INTRACAUDAL; PERINEURAL
Status: DISPENSED
Start: 2021-08-02

## (undated) RX ORDER — ACETAMINOPHEN 325 MG/1
TABLET ORAL
Status: DISPENSED
Start: 2021-07-19